# Patient Record
Sex: FEMALE | Race: ASIAN | NOT HISPANIC OR LATINO
[De-identification: names, ages, dates, MRNs, and addresses within clinical notes are randomized per-mention and may not be internally consistent; named-entity substitution may affect disease eponyms.]

---

## 2018-12-03 PROBLEM — Z00.00 ENCOUNTER FOR PREVENTIVE HEALTH EXAMINATION: Status: ACTIVE | Noted: 2018-12-03

## 2019-01-08 ENCOUNTER — APPOINTMENT (OUTPATIENT)
Dept: THORACIC SURGERY | Facility: CLINIC | Age: 54
End: 2019-01-08

## 2019-02-10 ENCOUNTER — INPATIENT (INPATIENT)
Facility: HOSPITAL | Age: 54
LOS: 2 days | Discharge: HOME | DRG: 57 | End: 2019-02-13
Attending: INTERNAL MEDICINE | Admitting: INTERNAL MEDICINE
Payer: MEDICAID

## 2019-02-10 VITALS
DIASTOLIC BLOOD PRESSURE: 74 MMHG | HEART RATE: 70 BPM | TEMPERATURE: 99 F | SYSTOLIC BLOOD PRESSURE: 126 MMHG | RESPIRATION RATE: 16 BRPM | OXYGEN SATURATION: 100 % | WEIGHT: 149.91 LBS | HEIGHT: 63 IN

## 2019-02-10 NOTE — ED ADULT TRIAGE NOTE - CHIEF COMPLAINT QUOTE
biba with c/o generalized weakness,difficulty walking since hour and a half,patient has good upperarm ,no drooling seen and speak normal as per family member

## 2019-02-11 DIAGNOSIS — Z29.9 ENCOUNTER FOR PROPHYLACTIC MEASURES, UNSPECIFIED: ICD-10-CM

## 2019-02-11 DIAGNOSIS — G70.00 MYASTHENIA GRAVIS WITHOUT (ACUTE) EXACERBATION: ICD-10-CM

## 2019-02-11 LAB
ALBUMIN SERPL ELPH-MCNC: 3.7 G/DL — SIGNIFICANT CHANGE UP (ref 3.5–5)
ALP SERPL-CCNC: 70 U/L — SIGNIFICANT CHANGE UP (ref 40–120)
ALT FLD-CCNC: 32 U/L DA — SIGNIFICANT CHANGE UP (ref 10–60)
ANION GAP SERPL CALC-SCNC: 4 MMOL/L — LOW (ref 5–17)
ANION GAP SERPL CALC-SCNC: 6 MMOL/L — SIGNIFICANT CHANGE UP (ref 5–17)
APPEARANCE UR: CLEAR — SIGNIFICANT CHANGE UP
APTT BLD: 31.1 SEC — SIGNIFICANT CHANGE UP (ref 27.5–36.3)
AST SERPL-CCNC: 27 U/L — SIGNIFICANT CHANGE UP (ref 10–40)
BASOPHILS # BLD AUTO: 0.1 K/UL — SIGNIFICANT CHANGE UP (ref 0–0.2)
BASOPHILS # BLD AUTO: 0.1 K/UL — SIGNIFICANT CHANGE UP (ref 0–0.2)
BASOPHILS NFR BLD AUTO: 0.7 % — SIGNIFICANT CHANGE UP (ref 0–2)
BASOPHILS NFR BLD AUTO: 0.8 % — SIGNIFICANT CHANGE UP (ref 0–2)
BILIRUB SERPL-MCNC: 0.2 MG/DL — SIGNIFICANT CHANGE UP (ref 0.2–1.2)
BILIRUB UR-MCNC: NEGATIVE — SIGNIFICANT CHANGE UP
BUN SERPL-MCNC: 13 MG/DL — SIGNIFICANT CHANGE UP (ref 7–18)
BUN SERPL-MCNC: 14 MG/DL — SIGNIFICANT CHANGE UP (ref 7–18)
CALCIUM SERPL-MCNC: 8.2 MG/DL — LOW (ref 8.4–10.5)
CALCIUM SERPL-MCNC: 8.6 MG/DL — SIGNIFICANT CHANGE UP (ref 8.4–10.5)
CHLORIDE SERPL-SCNC: 108 MMOL/L — SIGNIFICANT CHANGE UP (ref 96–108)
CHLORIDE SERPL-SCNC: 110 MMOL/L — HIGH (ref 96–108)
CHOLEST SERPL-MCNC: 179 MG/DL — SIGNIFICANT CHANGE UP (ref 10–199)
CO2 SERPL-SCNC: 26 MMOL/L — SIGNIFICANT CHANGE UP (ref 22–31)
CO2 SERPL-SCNC: 29 MMOL/L — SIGNIFICANT CHANGE UP (ref 22–31)
COLOR SPEC: YELLOW — SIGNIFICANT CHANGE UP
CREAT SERPL-MCNC: 0.71 MG/DL — SIGNIFICANT CHANGE UP (ref 0.5–1.3)
CREAT SERPL-MCNC: 0.71 MG/DL — SIGNIFICANT CHANGE UP (ref 0.5–1.3)
DIFF PNL FLD: ABNORMAL
EOSINOPHIL # BLD AUTO: 0.2 K/UL — SIGNIFICANT CHANGE UP (ref 0–0.5)
EOSINOPHIL # BLD AUTO: 0.2 K/UL — SIGNIFICANT CHANGE UP (ref 0–0.5)
EOSINOPHIL NFR BLD AUTO: 1.5 % — SIGNIFICANT CHANGE UP (ref 0–6)
EOSINOPHIL NFR BLD AUTO: 1.6 % — SIGNIFICANT CHANGE UP (ref 0–6)
GLUCOSE SERPL-MCNC: 121 MG/DL — HIGH (ref 70–99)
GLUCOSE SERPL-MCNC: 143 MG/DL — HIGH (ref 70–99)
GLUCOSE UR QL: 100 MG/DL
HBA1C BLD-MCNC: 5.8 % — HIGH (ref 4–5.6)
HCG SERPL-ACNC: <1 MIU/ML — SIGNIFICANT CHANGE UP
HCT VFR BLD CALC: 42.1 % — SIGNIFICANT CHANGE UP (ref 34.5–45)
HCT VFR BLD CALC: 43.3 % — SIGNIFICANT CHANGE UP (ref 34.5–45)
HDLC SERPL-MCNC: 84 MG/DL — SIGNIFICANT CHANGE UP
HGB BLD-MCNC: 13.7 G/DL — SIGNIFICANT CHANGE UP (ref 11.5–15.5)
HGB BLD-MCNC: 14.1 G/DL — SIGNIFICANT CHANGE UP (ref 11.5–15.5)
INR BLD: 0.87 RATIO — LOW (ref 0.88–1.16)
KETONES UR-MCNC: NEGATIVE — SIGNIFICANT CHANGE UP
LEUKOCYTE ESTERASE UR-ACNC: ABNORMAL
LIPID PNL WITH DIRECT LDL SERPL: 79 MG/DL — SIGNIFICANT CHANGE UP
LYMPHOCYTES # BLD AUTO: 1.5 K/UL — SIGNIFICANT CHANGE UP (ref 1–3.3)
LYMPHOCYTES # BLD AUTO: 1.5 K/UL — SIGNIFICANT CHANGE UP (ref 1–3.3)
LYMPHOCYTES # BLD AUTO: 12.6 % — LOW (ref 13–44)
LYMPHOCYTES # BLD AUTO: 15.2 % — SIGNIFICANT CHANGE UP (ref 13–44)
MAGNESIUM SERPL-MCNC: 2.2 MG/DL — SIGNIFICANT CHANGE UP (ref 1.6–2.6)
MCHC RBC-ENTMCNC: 31.2 PG — SIGNIFICANT CHANGE UP (ref 27–34)
MCHC RBC-ENTMCNC: 31.2 PG — SIGNIFICANT CHANGE UP (ref 27–34)
MCHC RBC-ENTMCNC: 32.5 GM/DL — SIGNIFICANT CHANGE UP (ref 32–36)
MCHC RBC-ENTMCNC: 32.6 GM/DL — SIGNIFICANT CHANGE UP (ref 32–36)
MCV RBC AUTO: 95.6 FL — SIGNIFICANT CHANGE UP (ref 80–100)
MCV RBC AUTO: 96 FL — SIGNIFICANT CHANGE UP (ref 80–100)
MONOCYTES # BLD AUTO: 0.4 K/UL — SIGNIFICANT CHANGE UP (ref 0–0.9)
MONOCYTES # BLD AUTO: 0.6 K/UL — SIGNIFICANT CHANGE UP (ref 0–0.9)
MONOCYTES NFR BLD AUTO: 3.6 % — SIGNIFICANT CHANGE UP (ref 2–14)
MONOCYTES NFR BLD AUTO: 5.2 % — SIGNIFICANT CHANGE UP (ref 2–14)
NEUTROPHILS # BLD AUTO: 7.8 K/UL — HIGH (ref 1.8–7.4)
NEUTROPHILS # BLD AUTO: 9.3 K/UL — HIGH (ref 1.8–7.4)
NEUTROPHILS NFR BLD AUTO: 79 % — HIGH (ref 43–77)
NEUTROPHILS NFR BLD AUTO: 80 % — HIGH (ref 43–77)
NITRITE UR-MCNC: NEGATIVE — SIGNIFICANT CHANGE UP
PH UR: 7 — SIGNIFICANT CHANGE UP (ref 5–8)
PHOSPHATE SERPL-MCNC: 3.5 MG/DL — SIGNIFICANT CHANGE UP (ref 2.5–4.5)
PLATELET # BLD AUTO: 228 K/UL — SIGNIFICANT CHANGE UP (ref 150–400)
PLATELET # BLD AUTO: 242 K/UL — SIGNIFICANT CHANGE UP (ref 150–400)
POTASSIUM SERPL-MCNC: 3.9 MMOL/L — SIGNIFICANT CHANGE UP (ref 3.5–5.3)
POTASSIUM SERPL-MCNC: 4 MMOL/L — SIGNIFICANT CHANGE UP (ref 3.5–5.3)
POTASSIUM SERPL-SCNC: 3.9 MMOL/L — SIGNIFICANT CHANGE UP (ref 3.5–5.3)
POTASSIUM SERPL-SCNC: 4 MMOL/L — SIGNIFICANT CHANGE UP (ref 3.5–5.3)
PROT SERPL-MCNC: 7.5 G/DL — SIGNIFICANT CHANGE UP (ref 6–8.3)
PROT UR-MCNC: NEGATIVE — SIGNIFICANT CHANGE UP
PROTHROM AB SERPL-ACNC: 9.6 SEC — LOW (ref 10–12.9)
RBC # BLD: 4.38 M/UL — SIGNIFICANT CHANGE UP (ref 3.8–5.2)
RBC # BLD: 4.53 M/UL — SIGNIFICANT CHANGE UP (ref 3.8–5.2)
RBC # FLD: 12.1 % — SIGNIFICANT CHANGE UP (ref 10.3–14.5)
RBC # FLD: 12.5 % — SIGNIFICANT CHANGE UP (ref 10.3–14.5)
SODIUM SERPL-SCNC: 141 MMOL/L — SIGNIFICANT CHANGE UP (ref 135–145)
SODIUM SERPL-SCNC: 142 MMOL/L — SIGNIFICANT CHANGE UP (ref 135–145)
SP GR SPEC: 1.01 — SIGNIFICANT CHANGE UP (ref 1.01–1.02)
TOTAL CHOLESTEROL/HDL RATIO MEASUREMENT: 2.1 RATIO — LOW (ref 3.3–7.1)
TRIGL SERPL-MCNC: 78 MG/DL — SIGNIFICANT CHANGE UP (ref 10–149)
TROPONIN I SERPL-MCNC: 0.02 NG/ML — SIGNIFICANT CHANGE UP (ref 0–0.04)
TSH SERPL-MCNC: 1.15 UU/ML — SIGNIFICANT CHANGE UP (ref 0.34–4.82)
UROBILINOGEN FLD QL: NEGATIVE — SIGNIFICANT CHANGE UP
WBC # BLD: 11.6 K/UL — HIGH (ref 3.8–10.5)
WBC # BLD: 9.9 K/UL — SIGNIFICANT CHANGE UP (ref 3.8–10.5)
WBC # FLD AUTO: 11.6 K/UL — HIGH (ref 3.8–10.5)
WBC # FLD AUTO: 9.9 K/UL — SIGNIFICANT CHANGE UP (ref 3.8–10.5)

## 2019-02-11 PROCEDURE — 99222 1ST HOSP IP/OBS MODERATE 55: CPT

## 2019-02-11 PROCEDURE — 99223 1ST HOSP IP/OBS HIGH 75: CPT

## 2019-02-11 PROCEDURE — 99285 EMERGENCY DEPT VISIT HI MDM: CPT | Mod: 25

## 2019-02-11 PROCEDURE — 70450 CT HEAD/BRAIN W/O DYE: CPT | Mod: 26

## 2019-02-11 PROCEDURE — 71046 X-RAY EXAM CHEST 2 VIEWS: CPT | Mod: 26

## 2019-02-11 RX ORDER — ENOXAPARIN SODIUM 100 MG/ML
40 INJECTION SUBCUTANEOUS EVERY 24 HOURS
Qty: 0 | Refills: 0 | Status: DISCONTINUED | OUTPATIENT
Start: 2019-02-11 | End: 2019-02-13

## 2019-02-11 RX ORDER — AZATHIOPRINE 100 MG/1
50 TABLET ORAL DAILY
Qty: 0 | Refills: 0 | Status: DISCONTINUED | OUTPATIENT
Start: 2019-02-11 | End: 2019-02-13

## 2019-02-11 RX ORDER — SODIUM CHLORIDE 9 MG/ML
3 INJECTION INTRAMUSCULAR; INTRAVENOUS; SUBCUTANEOUS ONCE
Qty: 0 | Refills: 0 | Status: COMPLETED | OUTPATIENT
Start: 2019-02-11 | End: 2019-02-11

## 2019-02-11 RX ORDER — CEFTRIAXONE 500 MG/1
1 INJECTION, POWDER, FOR SOLUTION INTRAMUSCULAR; INTRAVENOUS ONCE
Qty: 0 | Refills: 0 | Status: COMPLETED | OUTPATIENT
Start: 2019-02-11 | End: 2019-02-11

## 2019-02-11 RX ORDER — PYRIDOSTIGMINE BROMIDE 60 MG/5ML
2 SOLUTION ORAL
Qty: 0 | Refills: 0 | COMMUNITY

## 2019-02-11 RX ORDER — PYRIDOSTIGMINE BROMIDE 60 MG/5ML
0 SOLUTION ORAL
Qty: 180 | Refills: 0 | COMMUNITY

## 2019-02-11 RX ORDER — IMMUNE GLOBULIN (HUMAN) 10 G/100ML
30 INJECTION INTRAVENOUS; SUBCUTANEOUS ONCE
Qty: 0 | Refills: 0 | Status: DISCONTINUED | OUTPATIENT
Start: 2019-02-11 | End: 2019-02-11

## 2019-02-11 RX ORDER — PYRIDOSTIGMINE BROMIDE 60 MG/5ML
60 SOLUTION ORAL THREE TIMES A DAY
Qty: 0 | Refills: 0 | Status: DISCONTINUED | OUTPATIENT
Start: 2019-02-11 | End: 2019-02-13

## 2019-02-11 RX ORDER — IMMUNE GLOBULIN (HUMAN) 10 G/100ML
27 INJECTION INTRAVENOUS; SUBCUTANEOUS ONCE
Qty: 0 | Refills: 0 | Status: DISCONTINUED | OUTPATIENT
Start: 2019-02-11 | End: 2019-02-11

## 2019-02-11 RX ORDER — CEFTRIAXONE 500 MG/1
INJECTION, POWDER, FOR SOLUTION INTRAMUSCULAR; INTRAVENOUS
Qty: 0 | Refills: 0 | Status: DISCONTINUED | OUTPATIENT
Start: 2019-02-11 | End: 2019-02-13

## 2019-02-11 RX ORDER — IMMUNE GLOBULIN (HUMAN) 10 G/100ML
10 INJECTION INTRAVENOUS; SUBCUTANEOUS DAILY
Qty: 0 | Refills: 0 | Status: COMPLETED | OUTPATIENT
Start: 2019-02-11 | End: 2019-02-12

## 2019-02-11 RX ORDER — CEFTRIAXONE 500 MG/1
1 INJECTION, POWDER, FOR SOLUTION INTRAMUSCULAR; INTRAVENOUS EVERY 24 HOURS
Qty: 0 | Refills: 0 | Status: DISCONTINUED | OUTPATIENT
Start: 2019-02-12 | End: 2019-02-13

## 2019-02-11 RX ADMIN — CEFTRIAXONE 100 GRAM(S): 500 INJECTION, POWDER, FOR SOLUTION INTRAMUSCULAR; INTRAVENOUS at 06:29

## 2019-02-11 RX ADMIN — ENOXAPARIN SODIUM 40 MILLIGRAM(S): 100 INJECTION SUBCUTANEOUS at 06:29

## 2019-02-11 RX ADMIN — Medication 50 MILLIGRAM(S): at 08:33

## 2019-02-11 RX ADMIN — PYRIDOSTIGMINE BROMIDE 60 MILLIGRAM(S): 60 SOLUTION ORAL at 15:40

## 2019-02-11 RX ADMIN — SODIUM CHLORIDE 3 MILLILITER(S): 9 INJECTION INTRAMUSCULAR; INTRAVENOUS; SUBCUTANEOUS at 02:40

## 2019-02-11 RX ADMIN — IMMUNE GLOBULIN (HUMAN) 100 GRAM(S): 10 INJECTION INTRAVENOUS; SUBCUTANEOUS at 18:38

## 2019-02-11 RX ADMIN — IMMUNE GLOBULIN (HUMAN) 100 GRAM(S): 10 INJECTION INTRAVENOUS; SUBCUTANEOUS at 19:21

## 2019-02-11 RX ADMIN — AZATHIOPRINE 50 MILLIGRAM(S): 100 TABLET ORAL at 19:19

## 2019-02-11 NOTE — ED ADULT NURSE NOTE - CHIEF COMPLAINT QUOTE
PATIENT INSTRUCTIONS:      We discussed your CT lumbar spine findings. You were provided with a copy of your radiology report, for your records.       Will prescribe Naproxen, which is an anti-inflammatory medication. You can take 1 tab (500 mg) up to 2 times per day as needed. Always take with food/beverage. Risks/side effects: stomach irritation, effects on heart or kidneys.       Consider over-the-counter medication with Lidocaine in it (e.g. Salonpas patch, which has Lidocaine 4%). Apply this to the back. Follow directions on packaging.       Will order course of water therapy. Will provide prescription that you can take to the [facility]; we can also fax this prescription if needed. Will otherwise provide you with list of local pool locations. Check with your insurance about coverage.       Return to clinic after completion of therapy, approximately 8 weeks.     Thank you!    Scheduling number to the [facility]: [phone]. Call this number to schedule or change appointments.    Nurse Triage line: [phone]  Call this number with any questions or concerns. You can leave a message anytime. Please leave a detailed message. Calls are returned between 8 AM and 4 PM Monday through Thursday and from 8 AM to 12 Noon on Fridays. We usually get back to you within 24 to 48 hours depending on the issue/request.     We believe regular attendance is key to your success in our program.    Any time you are unable to keep your appointment we ask that you call us at least 24 hours in advance to let us know. This will allow us to offer the appointment time to another patient.       
biba with c/o generalized weakness,difficulty walking since hour and a half,patient has good upperarm ,no drooling seen and speak normal as per family member

## 2019-02-11 NOTE — H&P ADULT - PROBLEM SELECTOR PLAN 2
IMPROVE VTE Individual Risk Assessment    RISK                                                          Points  [] Previous VTE                                           3  [] Thrombophilia                                        2  [] Lower limb paralysis                              2   [] Current Cancer                                       2   [x] Immobilization > 24 hrs                        1  [] ICU/CCU stay > 24 hours                       1  [] Age > 60                                                   1    IMPROVE VTE Score: 1, no indication for DVT PPx

## 2019-02-11 NOTE — H&P ADULT - HISTORY OF PRESENT ILLNESS
55 y/o Mandarin F w/ PMH Myasthenia Gravis and no PSH p/w multiple falls x 1 day - last fall near 11 PM, last took Pyridostigmine at 6 PM. At the time of fall patient was unable to stand up - called her family who came and gave her 120 mg which improved her weakness. As per patient she has been taking the medications x 10 years - on and off - and recently restarted in January 2018 for worsening weakness. Of note patient began taking Prednisone ? mg since Friday. Otherwise patient denied any fevers, dysphagia, chills, CP, SOB, cough, abdominal pain, NVDC, urinary changes, or any other complaints. Attempted to reach the pharmacy to clarify medication dosing including steroid taper - did not . In the ED patient seen in NAD, no respiratory complaints, and labs noted for WBC elevation 11.6 and +UA albeit contaminated w/ squamous cells.

## 2019-02-11 NOTE — CHART NOTE - NSCHARTNOTEFT_GEN_A_CORE
Discussed the case w/ neurologist Dr. Reich who recommended starting Prednisone, monitoring NIF/VC, and preparing for IVIG infusions- Pending official consultation from Dr Newman

## 2019-02-11 NOTE — ED PROVIDER NOTE - PROGRESS NOTE DETAILS
labs unremarkable, CT head neg, CXR unremarkable  Discussed case with Dr. Reich-neurologist who recommends pyridostigmine 60mg TID and initiation of IVIG 400mg/kg per day for 5 days while inpatient.  discussed above with patient, pt stable for admission

## 2019-02-11 NOTE — H&P ADULT - ATTENDING COMMENTS
Discussed with Dr Austin - medical resident.  This is a 54 year old woman with PMH of Myasthenia Gravis followed in the o/p clinic by neurologist - Sam Bellamy ( [phone]) who presents with progressive and frequent falls over the last few weeks. She states that in the past 24 hours however, she had multiple falls with the last episode last evening after which she was unable to get up by herself. She denies any preceding symptoms and no LOC. She did not hit her head and denies any focal pain in the body or extremities.   Of note, her neurologist has been increasing / adjusting her dose of pyridostigmine and she used double the dose as instructed and she got better. She decided to come to the ED afterwards.  ROS was noncontributory except for subjective fever after her fall today.    Vital Signs Last 24 Hrs  T(C): 37.1 (10 Feb 2019 23:56), Max: 37.1 (10 Feb 2019 23:56)  T(F): 98.8 (10 Feb 2019 23:56), Max: 98.8 (10 Feb 2019 23:56)  HR: 70 (10 Feb 2019 23:56) (70 - 70)  BP: 126/74 (10 Feb 2019 23:56) (126/74 - 126/74)  RR: 16 (10 Feb 2019 23:56) (16 - 16)  SpO2: 100% (10 Feb 2019 23:56) (100% - 100%)    middle aged woman, NAD, AAO X 3  MMM, good degluttition, no LAD  CTA B/L; RRR S1S2   Soft, flat, NT ND BS +  No pedal edema  Power 4/5 in both forearms and 3/5 in both arms  Power 4/5 in both legs and 3/5 in the thighs  No CN deficits noted  No sensory deficits  Labs                        14.1   11.6  )-----------( 242      ( 2019 01:39 )             43.3   02-11    142  |  110<H>  |  14  ----------------------------<  121<H>  3.9   |  26  |  0.71    Ca    8.6      2019 01:39    TPro  7.5  /  Alb  3.7  /  TBili  0.2  /  DBili  x   /  AST  27  /  ALT  32  /  Alk Phos  70  02-11    Urinalysis Basic - ( 2019 00:43 )    Color: Yellow / Appearance: Clear / S.010 / pH: x  Gluc: x / Ketone: Negative  / Bili: Negative / Urobili: Negative   Blood: x / Protein: Negative / Nitrite: Negative   Leuk Esterase: Moderate / RBC: 5-10 /HPF / WBC 11-25 /HPF   Sq Epi: x / Non Sq Epi: Moderate /HPF / Bacteria: Moderate /HPF    trop - 0.024    Impression  Myasthenia gravis crises- worsening likely from underlying infection - UTI  Admit to medical [facility]opez  Obtain ucx and start empiric antibiotics - Rocephin  Continue pyridostigmine 60 mg PO TID  Hold steroid and await neurologist evaluation  Tentative recommendation for IVIG; would await his evaluation this AM

## 2019-02-11 NOTE — ED ADULT NURSE NOTE - NSIMPLEMENTINTERV_GEN_ALL_ED
Implemented All Fall Risk Interventions:  [place] to call system. Call bell, personal items and telephone within reach. Instruct patient to call for assistance. Room bathroom lighting operational. Non-slip footwear when patient is off stretcher. Physically safe environment: no spills, clutter or unnecessary equipment. Stretcher in lowest position, wheels locked, appropriate side rails in place. Provide visual cue, wrist band, yellow gown, etc. Monitor gait and stability. Monitor for mental status changes and reorient to person, place, and time. Review medications for side effects contributing to fall risk. Reinforce activity limits and safety measures with patient and family.

## 2019-02-11 NOTE — CONSULT NOTE ADULT - SUBJECTIVE AND OBJECTIVE BOX
Administer IVIg 400mg/kg (6 g) IV Daily x 5 days (30 g total over 5 days)  Continue pyridostigmine 40mg TID  Continue prednisone 50mg Daily - will taper as outpatient over 6 months  Start azathioprine 50mg daily x 7 days, then increase to 100mg daily x 7 days, then increase to 150mg daily as goal dose (non-steroidal immunosuppressant)  NIF Q6H  PT/OT    NOTE TO BE COMPLETED    Neurology Consult    Patient is a 54y old  Female who presents with a chief complaint of weakness (2019 04:13)      HPI:  53 y/o Mandarin F w/ PMH Myasthenia Gravis and no PSH p/w multiple falls x 1 day - last fall near 11 PM, last took Pyridostigmine at 6 PM. At the time of fall patient was unable to stand up - called her family who came and gave her 120 mg which improved her weakness. As per patient she has been taking the medications x 10 years - on and off - and recently restarted in 2018 for worsening weakness. Of note patient began taking Prednisone ? mg since Friday. Otherwise patient denied any fevers, dysphagia, chills, CP, SOB, cough, abdominal pain, NVDC, urinary changes, or any other complaints. Attempted to reach the pharmacy to clarify medication dosing including steroid taper - did not . In the ED patient seen in NAD, no respiratory complaints, and labs noted for WBC elevation 11.6 and +UA albeit contaminated w/ squamous cells. (2019 04:13)      PAST MEDICAL & SURGICAL HISTORY:  Myasthenia gravis  No significant past surgical history      FAMILY HISTORY:  No pertinent family history in first degree relatives      Social History: (-) x 3    Allergies    No Known Allergies    Intolerances        MEDICATIONS  (STANDING):  cefTRIAXone   IVPB      enoxaparin Injectable 40 milliGRAM(s) SubCutaneous every 24 hours  immune   globulin 10% (GAMMAGARD) IVPB 6 Gram(s) IV Intermittent daily  predniSONE   Tablet 50 milliGRAM(s) Oral daily  pyridostigmine 60 milliGRAM(s) Oral three times a day    MEDICATIONS  (PRN):      Review of systems:    Constitutional: No fever, weight loss or fatigue    Eyes: No eye pain or discharge  ENMT:  No difficulty hearing; No sinus or throat pain  Neck: No pain or stiffness  Respiratory: No cough, wheezing, chills or hemoptysis  Cardiovascular: No chest pain, palpitations, shortness of breath, dyspnea on exertion  Gastrointestinal: No abdominal pain, nausea, vomiting or hematemesis; No diarrhea or constipation.   Genitourinary: No dysuria, frequency, hematuria or incontinence  Neurological: As per HPI  Skin: No rashes or lesions   Endocrine: No heat or cold intolerance; No hair loss  Musculoskeletal: No joint pain or swelling  Psychiatric: No depression, anxiety, mood swings  Heme/Lymph: No easy bruising or bleeding gums    Vital Signs Last 24 Hrs  T(C): 37.3 (2019 11:00), Max: 37.4 (2019 07:50)  T(F): 99.2 (2019 11:00), Max: 99.3 (2019 07:50)  HR: 65 (:) (58 - 70)  BP: 99/61 (2019 11:) (97/49 - 126/74)  BP(mean): --  RR: 16 (2019 11:00) (16 - 16)  SpO2: 100% (:) (98% - 100%)    Neurologic Examination:  General:  Appearance is consistent with chronologic age.  No abnormal facies.   General: The patient is oriented to person, place, time and date.  Recent and remote memory intact.  Fund of knowledge is intact and normal.  Language with normal repetition, comprehension and naming.  Nondysarthric.    Cranial nerves: intact VA, VFF.  EOMI w/o nystagmus, skew or reported double vision.  PERRL.  No ptosis/weakness of eyelid closure.  Facial sensation is normal with normal bite.  No facial asymmetry.  Hearing grossly intact b/l.  Palate elevates midline.  Tongue midline.  Motor examination:   Normal tone, bulk and range of motion.  No tenderness, twitching, tremors or involuntary movements.  Formal Muscle Strength Testing: (MRC grade R/L) 5/5 UE; 5/5 LE.  No observable drift.  Reflexes:   2+ b/l pectoralis, biceps, triceps, brachioradialis, patella and Achilles.  Plantar response downgoing b/l.  Jaw jerk, Fareed, clonus absent.  Sensory examination:   Intact to light touch and pinprick, pain, temperature and proprioception and vibration in all extremities.  Cerebellum:   FTN/HKS intact with normal SHWETHA in all limbs.  No dysmetria or dysdiadokinesia.  Gait narrow based and normal.    Labs:   CBC Full  -  ( 2019 10:07 )  WBC Count : 9.9 K/uL  Hemoglobin : 13.7 g/dL  Hematocrit : 42.1 %  Platelet Count - Automated : 228 K/uL  Mean Cell Volume : 96.0 fl  Mean Cell Hemoglobin : 31.2 pg  Mean Cell Hemoglobin Concentration : 32.5 gm/dL  Auto Neutrophil # : 7.8 K/uL  Auto Lymphocyte # : 1.5 K/uL  Auto Monocyte # : 0.4 K/uL  Auto Eosinophil # : 0.2 K/uL  Auto Basophil # : 0.1 K/uL  Auto Neutrophil % : 79.0 %  Auto Lymphocyte % : 15.2 %  Auto Monocyte % : 3.6 %  Auto Eosinophil % : 1.6 %  Auto Basophil % : 0.8 %        141  |  108  |  13  ----------------------------<  143<H>  4.0   |  29  |  0.71    Ca    8.2<L>      2019 10:07  Phos  3.5       Mg     2.2         TPro  7.5  /  Alb  3.7  /  TBili  0.2  /  DBili  x   /  AST  27  /  ALT  32  /  AlkPhos  70  11    LIVER FUNCTIONS - ( 2019 01:39 )  Alb: 3.7 g/dL / Pro: 7.5 g/dL / ALK PHOS: 70 U/L / ALT: 32 U/L DA / AST: 27 U/L / GGT: x           PT/INR - ( 2019 01:39 )   PT: 9.6 sec;   INR: 0.87 ratio         PTT - ( 2019 01:39 )  PTT:31.1 sec  Urinalysis Basic - ( 2019 00:43 )    Color: Yellow / Appearance: Clear / S.010 / pH: x  Gluc: x / Ketone: Negative  / Bili: Negative / Urobili: Negative   Blood: x / Protein: Negative / Nitrite: Negative   Leuk Esterase: Moderate / RBC: 5-10 /HPF / WBC 11-25 /HPF   Sq Epi: x / Non Sq Epi: Moderate /HPF / Bacteria: Moderate /HPF          Neuroimaging:  NCHCT: CT Head No Cont:   EXAM:  CT BRAIN                            PROCEDURE DATE:  2019          INTERPRETATION:  Clinical indication: Trauma.    Technique: CT axial images of the head were obtained without intravenous   contrast. Computer-reconstructed coronal andsagittal images were   obtained.    Comparison: None.    Findings: There is no acute intracranial hemorrhage, large cortical   infarct, mass effect or midline shift. There is mild prominence of the   cortical sulci. The ventricles are not dilated.     There is no depressed skull fracture. There is minimal mucosal thickening   of the sinuses. The tympanomastoid region is clear.      Impression:     No acute intracranial hemorrhage or displaced skull fracture. If   clinically indicated, short-term follow-up or MRI may be obtained for   further evaluation.                GEOVANY RATLIFF M.D., ATTENDING RADIOLOGIST  This document has been electronically signed. 2019  3:30AM             (19 @ 03:33)    CT Angiography/Perfusion:  MRI Brain NC:  MRA Head/Neck:  EEG:    Assessment:  This is a 54y Female with h/o     Plan:   -   19 @ 13:44          Please contact the Neurology consult service with any questions.    Jose Antonio MD  Neurology Attending  [facility] Administer IVIg 667 mg/kg (10 g) IV Daily x 3 days (30 g total over 5 days)  Continue pyridostigmine 40mg TID  Continue prednisone 50mg Daily - will taper as outpatient over 6 months  Start azathioprine 50mg daily x 7 days, then increase to 100mg daily x 7 days, then increase to 150mg daily as goal dose (non-steroidal immunosuppressant)  NIF Q6H  PT/OT    NOTE TO BE COMPLETED    Neurology Consult    Patient is a 54y old  Female who presents with a chief complaint of weakness (2019 04:13)      HPI:  55 y/o Mandarin F w/ PMH Myasthenia Gravis and no PSH p/w multiple falls x 1 day - last fall near 11 PM, last took Pyridostigmine at 6 PM. At the time of fall patient was unable to stand up - called her family who came and gave her 120 mg which improved her weakness. As per patient she has been taking the medications x 10 years - on and off - and recently restarted in 2018 for worsening weakness. Of note patient began taking Prednisone ? mg since Friday. Otherwise patient denied any fevers, dysphagia, chills, CP, SOB, cough, abdominal pain, NVDC, urinary changes, or any other complaints. Attempted to reach the pharmacy to clarify medication dosing including steroid taper - did not . In the ED patient seen in NAD, no respiratory complaints, and labs noted for WBC elevation 11.6 and +UA albeit contaminated w/ squamous cells. (2019 04:13)      PAST MEDICAL & SURGICAL HISTORY:  Myasthenia gravis  No significant past surgical history      FAMILY HISTORY:  No pertinent family history in first degree relatives      Social History: (-) x 3    Allergies    No Known Allergies    Intolerances        MEDICATIONS  (STANDING):  cefTRIAXone   IVPB      enoxaparin Injectable 40 milliGRAM(s) SubCutaneous every 24 hours  immune   globulin 10% (GAMMAGARD) IVPB 6 Gram(s) IV Intermittent daily  predniSONE   Tablet 50 milliGRAM(s) Oral daily  pyridostigmine 60 milliGRAM(s) Oral three times a day    MEDICATIONS  (PRN):      Review of systems:    Constitutional: No fever, weight loss or fatigue    Eyes: No eye pain or discharge  ENMT:  No difficulty hearing; No sinus or throat pain  Neck: No pain or stiffness  Respiratory: No cough, wheezing, chills or hemoptysis  Cardiovascular: No chest pain, palpitations, shortness of breath, dyspnea on exertion  Gastrointestinal: No abdominal pain, nausea, vomiting or hematemesis; No diarrhea or constipation.   Genitourinary: No dysuria, frequency, hematuria or incontinence  Neurological: As per HPI  Skin: No rashes or lesions   Endocrine: No heat or cold intolerance; No hair loss  Musculoskeletal: No joint pain or swelling  Psychiatric: No depression, anxiety, mood swings  Heme/Lymph: No easy bruising or bleeding gums    Vital Signs Last 24 Hrs  T(C): 37.3 (2019 11:00), Max: 37.4 (2019 07:50)  T(F): 99.2 (2019 11:00), Max: 99.3 (2019 07:50)  HR: 65 (:) (58 - 70)  BP: 99/61 (2019 11:) (97/49 - 126/74)  BP(mean): --  RR: 16 (2019 11:) (16 - 16)  SpO2: 100% (:) (98% - 100%)    Neurologic Examination:  General:  Appearance is consistent with chronologic age.  No abnormal facies.   General: The patient is oriented to person, place, time and date.  Recent and remote memory intact.  Fund of knowledge is intact and normal.  Language with normal repetition, comprehension and naming.  Nondysarthric.    Cranial nerves: intact VA, VFF.  EOMI w/o nystagmus, skew or reported double vision.  PERRL.  No ptosis/weakness of eyelid closure.  Facial sensation is normal with normal bite.  No facial asymmetry.  Hearing grossly intact b/l.  Palate elevates midline.  Tongue midline.  Motor examination:   Normal tone, bulk and range of motion.  No tenderness, twitching, tremors or involuntary movements.  Formal Muscle Strength Testing: (MRC grade R/L) 5/5 UE; 5/5 LE.  No observable drift.  Reflexes:   2+ b/l pectoralis, biceps, triceps, brachioradialis, patella and Achilles.  Plantar response downgoing b/l.  Jaw jerk, Fareed, clonus absent.  Sensory examination:   Intact to light touch and pinprick, pain, temperature and proprioception and vibration in all extremities.  Cerebellum:   FTN/HKS intact with normal SHWETHA in all limbs.  No dysmetria or dysdiadokinesia.  Gait narrow based and normal.    Labs:   CBC Full  -  ( 2019 10:07 )  WBC Count : 9.9 K/uL  Hemoglobin : 13.7 g/dL  Hematocrit : 42.1 %  Platelet Count - Automated : 228 K/uL  Mean Cell Volume : 96.0 fl  Mean Cell Hemoglobin : 31.2 pg  Mean Cell Hemoglobin Concentration : 32.5 gm/dL  Auto Neutrophil # : 7.8 K/uL  Auto Lymphocyte # : 1.5 K/uL  Auto Monocyte # : 0.4 K/uL  Auto Eosinophil # : 0.2 K/uL  Auto Basophil # : 0.1 K/uL  Auto Neutrophil % : 79.0 %  Auto Lymphocyte % : 15.2 %  Auto Monocyte % : 3.6 %  Auto Eosinophil % : 1.6 %  Auto Basophil % : 0.8 %        141  |  108  |  13  ----------------------------<  143<H>  4.0   |  29  |  0.71    Ca    8.2<L>      2019 10:07  Phos  3.5       Mg     2.2         TPro  7.5  /  Alb  3.7  /  TBili  0.2  /  DBili  x   /  AST  27  /  ALT  32  /  AlkPhos  70  11    LIVER FUNCTIONS - ( 2019 01:39 )  Alb: 3.7 g/dL / Pro: 7.5 g/dL / ALK PHOS: 70 U/L / ALT: 32 U/L DA / AST: 27 U/L / GGT: x           PT/INR - ( 2019 01:39 )   PT: 9.6 sec;   INR: 0.87 ratio         PTT - ( 2019 01:39 )  PTT:31.1 sec  Urinalysis Basic - ( 2019 00:43 )    Color: Yellow / Appearance: Clear / S.010 / pH: x  Gluc: x / Ketone: Negative  / Bili: Negative / Urobili: Negative   Blood: x / Protein: Negative / Nitrite: Negative   Leuk Esterase: Moderate / RBC: 5-10 /HPF / WBC 11-25 /HPF   Sq Epi: x / Non Sq Epi: Moderate /HPF / Bacteria: Moderate /HPF          Neuroimaging:  NCHCT: CT Head No Cont:   EXAM:  CT BRAIN                            PROCEDURE DATE:  2019          INTERPRETATION:  Clinical indication: Trauma.    Technique: CT axial images of the head were obtained without intravenous   contrast. Computer-reconstructed coronal andsagittal images were   obtained.    Comparison: None.    Findings: There is no acute intracranial hemorrhage, large cortical   infarct, mass effect or midline shift. There is mild prominence of the   cortical sulci. The ventricles are not dilated.     There is no depressed skull fracture. There is minimal mucosal thickening   of the sinuses. The tympanomastoid region is clear.      Impression:     No acute intracranial hemorrhage or displaced skull fracture. If   clinically indicated, short-term follow-up or MRI may be obtained for   further evaluation.                GEOVANY RATLIFF M.D., ATTENDING RADIOLOGIST  This document has been electronically signed. 2019  3:30AM             (19 @ 03:33)    CT Angiography/Perfusion:  MRI Brain NC:  MRA Head/Neck:  EEG:    Assessment:  This is a 54y Female with h/o     Plan:   -   19 @ 13:44          Please contact the Neurology consult service with any questions.    Jose Antonio MD  Neurology Attending  [facility] Neurology Consult    Patient is a 54y old  Female who presents with a chief complaint of weakness (2019 04:13)    HPI:  54 Mandarin-speaking RHF, translation provided by  service, diagnosed with myasthenia gravis about 10 years ago and reports a 1-month generalized weakness resulting in difficulty standing up and maintaining balance. She is managed by neurologist, Dr. Pan Calvert, in [place], and is being treated with pyridostigmine 60mg PO TID. She had taken this on and off over the past 10 years, but resumed it regularly last month on the advice of her neurologist when her new weakness developed. Her neurologist started her on prednisone 10mg daily 3 days ago, but she has not seen any improvement.     PAST MEDICAL & SURGICAL HISTORY:  Myasthenia gravis    FAMILY HISTORY: Denied by patient    Social History: Denies tobacco, alcohol, and illicit drug use    Allergies: No Known Allergies    MEDICATIONS  (STANDING):  cefTRIAXone   IVPB      enoxaparin Injectable 40 milliGRAM(s) SubCutaneous every 24 hours  immune   globulin 10% (GAMMAGARD) IVPB 6 Gram(s) IV Intermittent daily  predniSONE   Tablet 50 milliGRAM(s) Oral daily  pyridostigmine 60 milliGRAM(s) Oral three times a day    Review of systems:    Constitutional: No fever, weight loss or fatigue    Eyes: No eye pain or discharge  ENMT:  No difficulty hearing; No sinus or throat pain  Neck: No pain or stiffness  Respiratory: No cough, wheezing, chills or hemoptysis  Cardiovascular: No chest pain, palpitations, shortness of breath, dyspnea on exertion  Gastrointestinal: No abdominal pain, nausea, vomiting or hematemesis; No diarrhea or constipation.   Genitourinary: No dysuria, frequency, hematuria or incontinence  Neurological: As per HPI  Skin: No rashes or lesions   Endocrine: No heat or cold intolerance; No hair loss  Musculoskeletal: Weakness as per HPI  Psychiatric: No depression, anxiety, mood swings  Heme/Lymph: No easy bruising or bleeding gums      Vital Signs Last 24 Hrs  T(C): 37.3 (2019 11:00), Max: 37.4 (2019 07:50)  T(F): 99.2 (2019 11:00), Max: 99.3 (2019 07:50)  HR: 65 (2019 11:00) (58 - 70)  BP: 99/61 (2019 11:00) (97/49 - 126/74)  RR: 16 (2019 11:00) (16 - 16)  SpO2: 100% (2019 11:00) (98% - 100%)    General Exam:  General: No acute distress  Respiratory: CTAB/l.  No crackles, rhonchi, or wheezes. Able to count up to 40 in one breath.  Cardiovascular: RRR, No murmurs, Full b/l radial and pedal pulses    Neurological Exam:  General / Mental Status: Oriented to person, place, and time.  No dysarthria or aphasia present.  Naming and repetition intact.  Cranial Nerves: PERRLA, No miosis or mydriasis, EOMI x 2, VFF x 4, No nystagmus or diplopia, Optic discs normal b/l.  B/l V1-V3 equal to light touch and pinprick.  Symmetric facial movement and palate elevation.  B/l hearing equal to finger rub.  5/5 strength with b/l sternocleidomastoid and trapezius.  Midline tongue protrusion with no atrophy or fasciculations.  Motor: Normal bulk and tone in all four extremities.  3/5 strength throughout bilateral lower extremities.  4/5 strength throughout bilateral upper extremities.  No downward drift, rigidity, spasticity, or tremors in any of the four extremities.  Sensation: Intact to light touch and pinprick in all four extremities.  Negative Romberg.  Coordination: No dysmetria with b/l finger-to-nose and heel-to-shin tests.  Normal rapid alternating movements b/l.  Reflexes: 1+ and symmetric at b/l pectoralis, biceps, triceps, and brachioradialis.  Absent at b/l patellae and ankles.  Toes mute b/l.  Gait: Narrow-based, cautious, with short steps. Can only take four steps without difficulty.  Unable to initiate tiptoe, heel, and tandem gaits.      Labs:   CBC Full  -  ( 2019 10:07 )  WBC Count : 9.9 K/uL  Hemoglobin : 13.7 g/dL  Hematocrit : 42.1 %  Platelet Count - Automated : 228 K/uL  Mean Cell Volume : 96.0 fl  Mean Cell Hemoglobin : 31.2 pg  Mean Cell Hemoglobin Concentration : 32.5 gm/dL  Auto Neutrophil # : 7.8 K/uL  Auto Lymphocyte # : 1.5 K/uL  Auto Monocyte # : 0.4 K/uL  Auto Eosinophil # : 0.2 K/uL  Auto Basophil # : 0.1 K/uL  Auto Neutrophil % : 79.0 %  Auto Lymphocyte % : 15.2 %  Auto Monocyte % : 3.6 %  Auto Eosinophil % : 1.6 %  Auto Basophil % : 0.8 %        141  |  108  |  13  ----------------------------<  143<H>  4.0   |  29  |  0.71    Ca    8.2<L>      2019 10:07  Phos  3.5       Mg     2.2         TPro  7.5  /  Alb  3.7  /  TBili  0.2  /  DBili  x   /  AST  27  /  ALT  32  /  AlkPhos  70      LIVER FUNCTIONS - ( 2019 01:39 )  Alb: 3.7 g/dL / Pro: 7.5 g/dL / ALK PHOS: 70 U/L / ALT: 32 U/L DA / AST: 27 U/L / GGT: x           PT/INR - ( 2019 01:39 )   PT: 9.6 sec;   INR: 0.87 ratio    PTT - ( 2019 01:39 )  PTT:31.1 sec    Urinalysis Basic - ( 2019 00:43 )  Color: Yellow / Appearance: Clear / S.010 / pH: x  Gluc: x / Ketone: Negative  / Bili: Negative / Urobili: Negative   Blood: x / Protein: Negative / Nitrite: Negative   Leuk Esterase: Moderate / RBC: 5-10 /HPF / WBC 11-25 /HPF   Sq Epi: x / Non Sq Epi: Moderate /HPF / Bacteria: Moderate /HPF  (concern for contamination with squamous cells)    Hepatitis C virus Nonreactive      Neuroimaging:    CT BRAIN (2019): No acute intracranial abnormality      Assessment:  54 RHF with likely myasthenia gravis exacerbation, likely secondary to chronic medication noncompliance; unlikely to be cholinergic crisis.      Recommendations:    1. To treat myasthenia gravis exacerbation, administer intravenous immunoglobulin (IVIg) 667 mg/kg (30 mg) IV Daily x 3 days (Total: 2000 mg/kg).    2. Continue pyridostigmine 60mg PO TID for long-term management of myasthenia gravis. Although patient reports improvement with a recent one-time dose of pyridostigmine 120mg, this is not a good sustainable dose over time.    3. Continue prednisone at a higher dose of 50mg PO daily for immunosuppression; plan to titrate as an outpatient at 6 months.    4. Start non-steroidal immunosuppressant for long-term management; this will not take full effect for 6 months, so prednisone should stay as an active medication during that time.    5. Continue negative inspiratory force and vital capacity checks Q6H.    6. Start PT/OT evaluation to guide discharge disposition.      Please contact the Neurology consult service with any questions.    Jose Antonio MD  Neurology Attending  [facility]

## 2019-02-11 NOTE — ED ADULT NURSE REASSESSMENT NOTE - NS ED NURSE REASSESS COMMENT FT1
Patient resting in bed, no acute distress noted, denies chest pain, no shortness of breath indicated. Patient admitted, awaiting bed. Safety maintained.

## 2019-02-11 NOTE — H&P ADULT - ASSESSMENT
53 y/o Mandarin F w/ PMH Myasthenia Gravis and no PSH p/w multiple falls x 1 day - admitting for concern for myasthenic crisis

## 2019-02-11 NOTE — H&P ADULT - NSHPPHYSICALEXAM_GEN_ALL_CORE
T(C): 37.1 (10 Feb 2019 23:56), Max: 37.1 (10 Feb 2019 23:56)  T(F): 98.8 (10 Feb 2019 23:56), Max: 98.8 (10 Feb 2019 23:56)  HR: 70 (10 Feb 2019 23:56) (70 - 70)  BP: 126/74 (10 Feb 2019 23:56) (126/74 - 126/74)  RR: 16 (10 Feb 2019 23:56) (16 - 16)  SpO2: 100% (10 Feb 2019 23:56) (100% - 100%)

## 2019-02-11 NOTE — H&P ADULT - PROBLEM SELECTOR PLAN 1
Likely 2/2 infection (WBC 11.6, +UA) versus less likely medication noncompliance (unclear historian)  - No clinical signs of respiratory depression or bulbar weakness  - C/w Pyridostigmine 60 TID; titrate as per Neurology  - C/w Rocephin 1 gram; f/u UCx  - Holding outpatient steroids  Neurology Dr Reich consulted

## 2019-02-11 NOTE — ED PROVIDER NOTE - OBJECTIVE STATEMENT
55 y/o F w/ PMHx of MG, who is currently on new medication, presents to ED c/o weakness. Pt reports that in the last month she has had 3 falls related to MG. Pt reports that last night, at 2245, she was walking to bathroom, when she almost fell. Pt did hurt head, no significant injury. Pt took two additional tabs of MG meds which she states noted some improvement of weakness. Pt called EMS bc of frequent episodes. Medication: pyridostigmine bromide 60mg 2 tabs t.i.d. Last Friday, pt was started on steoirds. Neurologist: Dr. Pan jones 55 y/o F w/ PMHx of MG, who is currently on new medication, presents to ED c/o weakness. Pt reports that in the last month she has had 3 falls related to MG. Pt reports that last night, at 2245, she was walking to bathroom, when she almost fell. Pt did hurt head, no significant injury. Pt took two additional tabs of MG meds which she states noted some improvement of weakness. Pt called EMS bc of frequent episodes. Medication: pyridostigmine bromide 60mg 2 tabs t.i.d. Two days ago, pt was started on steoirds. Neurologist: Dr. Pan jones 53 y/o F w/ PMHx of MG, who is currently on new medication, presents to ED c/o weakness. Pt reports that in the last month she has had 3 falls related to MG. Pt reports that last night, at 2245, she was walking to bathroom, when she almost fell. Pt did hurt head, no significant injury. Pt took two additional tabs of MG meds which she states noted some improvement of weakness. Pt called EMS bc of frequent episodes. Medication: pyridostigmine bromide 60mg 2 tabs t.i.d. Two days ago, pt was started on steoirds. Neurologist: Dr. Pan jones [phone] 55 y/o F w/ PMHx of MG, who is currently on new medication, presents to ED c/o weakness. Pt reports that in the last month she has had 3 falls related to MG. Pt reports that last night, at 2245, she was walking to bathroom, when she almost fell. Pt did hurt head, no significant injury. Pt took two additional tabs of MG meds which she states noted some improvement of weakness. Pt called EMS bc of frequent episodes. Medication: pyridostigmine bromide 60mg 2 tabs t.i.d. Two days ago, pt was started on steoirds. Neurologist: Dr. Pan jones [phone]  Mandarin  ID#[id number]

## 2019-02-11 NOTE — ED PROVIDER NOTE - MEDICAL DECISION MAKING DETAILS
55yo with MG presents with worsening diffuse weakness causing fall. will obtain labs, CThead. Will consult with neurology regarding managament.

## 2019-02-11 NOTE — ED ADULT NURSE NOTE - OBJECTIVE STATEMENT
pt complains of muscle weakness and a fall today. pt says last time pain happened was in January. pt denies n/v and is sitting comfortable.

## 2019-02-12 DIAGNOSIS — N39.0 URINARY TRACT INFECTION, SITE NOT SPECIFIED: ICD-10-CM

## 2019-02-12 LAB
ANION GAP SERPL CALC-SCNC: 3 MMOL/L — LOW (ref 5–17)
ANION GAP SERPL CALC-SCNC: 6 MMOL/L — SIGNIFICANT CHANGE UP (ref 5–17)
BUN SERPL-MCNC: 13 MG/DL — SIGNIFICANT CHANGE UP (ref 7–18)
BUN SERPL-MCNC: 14 MG/DL — SIGNIFICANT CHANGE UP (ref 7–18)
CALCIUM SERPL-MCNC: 8.3 MG/DL — LOW (ref 8.4–10.5)
CALCIUM SERPL-MCNC: 8.6 MG/DL — SIGNIFICANT CHANGE UP (ref 8.4–10.5)
CHLORIDE SERPL-SCNC: 107 MMOL/L — SIGNIFICANT CHANGE UP (ref 96–108)
CHLORIDE SERPL-SCNC: 109 MMOL/L — HIGH (ref 96–108)
CO2 SERPL-SCNC: 28 MMOL/L — SIGNIFICANT CHANGE UP (ref 22–31)
CO2 SERPL-SCNC: 28 MMOL/L — SIGNIFICANT CHANGE UP (ref 22–31)
CREAT SERPL-MCNC: 0.71 MG/DL — SIGNIFICANT CHANGE UP (ref 0.5–1.3)
CREAT SERPL-MCNC: 0.83 MG/DL — SIGNIFICANT CHANGE UP (ref 0.5–1.3)
CULTURE RESULTS: NO GROWTH — SIGNIFICANT CHANGE UP
GLUCOSE SERPL-MCNC: 104 MG/DL — HIGH (ref 70–99)
GLUCOSE SERPL-MCNC: 145 MG/DL — HIGH (ref 70–99)
HCT VFR BLD CALC: 41.6 % — SIGNIFICANT CHANGE UP (ref 34.5–45)
HGB BLD-MCNC: 13.4 G/DL — SIGNIFICANT CHANGE UP (ref 11.5–15.5)
MCHC RBC-ENTMCNC: 31 PG — SIGNIFICANT CHANGE UP (ref 27–34)
MCHC RBC-ENTMCNC: 32.2 GM/DL — SIGNIFICANT CHANGE UP (ref 32–36)
MCV RBC AUTO: 96 FL — SIGNIFICANT CHANGE UP (ref 80–100)
PLATELET # BLD AUTO: 240 K/UL — SIGNIFICANT CHANGE UP (ref 150–400)
POTASSIUM SERPL-MCNC: 3.4 MMOL/L — LOW (ref 3.5–5.3)
POTASSIUM SERPL-MCNC: 4.2 MMOL/L — SIGNIFICANT CHANGE UP (ref 3.5–5.3)
POTASSIUM SERPL-SCNC: 3.4 MMOL/L — LOW (ref 3.5–5.3)
POTASSIUM SERPL-SCNC: 4.2 MMOL/L — SIGNIFICANT CHANGE UP (ref 3.5–5.3)
RBC # BLD: 4.32 M/UL — SIGNIFICANT CHANGE UP (ref 3.8–5.2)
RBC # FLD: 12.3 % — SIGNIFICANT CHANGE UP (ref 10.3–14.5)
SODIUM SERPL-SCNC: 140 MMOL/L — SIGNIFICANT CHANGE UP (ref 135–145)
SODIUM SERPL-SCNC: 141 MMOL/L — SIGNIFICANT CHANGE UP (ref 135–145)
SPECIMEN SOURCE: SIGNIFICANT CHANGE UP
WBC # BLD: 8.2 K/UL — SIGNIFICANT CHANGE UP (ref 3.8–10.5)
WBC # FLD AUTO: 8.2 K/UL — SIGNIFICANT CHANGE UP (ref 3.8–10.5)

## 2019-02-12 PROCEDURE — 99231 SBSQ HOSP IP/OBS SF/LOW 25: CPT

## 2019-02-12 PROCEDURE — 71250 CT THORAX DX C-: CPT | Mod: 26

## 2019-02-12 PROCEDURE — 99233 SBSQ HOSP IP/OBS HIGH 50: CPT | Mod: GC

## 2019-02-12 RX ORDER — POTASSIUM CHLORIDE 20 MEQ
40 PACKET (EA) ORAL ONCE
Qty: 0 | Refills: 0 | Status: COMPLETED | OUTPATIENT
Start: 2019-02-12 | End: 2019-02-12

## 2019-02-12 RX ORDER — PANTOPRAZOLE SODIUM 20 MG/1
40 TABLET, DELAYED RELEASE ORAL
Qty: 0 | Refills: 0 | Status: DISCONTINUED | OUTPATIENT
Start: 2019-02-12 | End: 2019-02-13

## 2019-02-12 RX ADMIN — ENOXAPARIN SODIUM 40 MILLIGRAM(S): 100 INJECTION SUBCUTANEOUS at 06:02

## 2019-02-12 RX ADMIN — IMMUNE GLOBULIN (HUMAN) 100 GRAM(S): 10 INJECTION INTRAVENOUS; SUBCUTANEOUS at 14:52

## 2019-02-12 RX ADMIN — PYRIDOSTIGMINE BROMIDE 60 MILLIGRAM(S): 60 SOLUTION ORAL at 21:13

## 2019-02-12 RX ADMIN — Medication 50 MILLIGRAM(S): at 06:02

## 2019-02-12 RX ADMIN — PYRIDOSTIGMINE BROMIDE 60 MILLIGRAM(S): 60 SOLUTION ORAL at 13:13

## 2019-02-12 RX ADMIN — PYRIDOSTIGMINE BROMIDE 60 MILLIGRAM(S): 60 SOLUTION ORAL at 06:02

## 2019-02-12 RX ADMIN — CEFTRIAXONE 100 GRAM(S): 500 INJECTION, POWDER, FOR SOLUTION INTRAMUSCULAR; INTRAVENOUS at 06:04

## 2019-02-12 RX ADMIN — Medication 10 MILLIGRAM(S): at 11:51

## 2019-02-12 RX ADMIN — Medication 40 MILLIEQUIVALENT(S): at 11:51

## 2019-02-12 RX ADMIN — PYRIDOSTIGMINE BROMIDE 60 MILLIGRAM(S): 60 SOLUTION ORAL at 00:38

## 2019-02-12 RX ADMIN — AZATHIOPRINE 50 MILLIGRAM(S): 100 TABLET ORAL at 11:51

## 2019-02-12 NOTE — PROGRESS NOTE ADULT - ASSESSMENT
53yo female w/ most likely Myasthenia Gravis Exacerbation 53yo female w/ Myasthenia Gravis Exacerbation, improving with IVIg

## 2019-02-12 NOTE — PROGRESS NOTE ADULT - PROBLEM SELECTOR PLAN 1
Likely 2/2 infection (WBC 11.6, +UA) versus less likely medication noncompliance (unclear historian)  - No clinical signs of respiratory depression or bulbar weakness  - C/w Pyridostigmine 60 TID; titrate as per Neurology  - C/w Rocephin 1 gram; f/u UCx  - Holding outpatient steroids  Neurology Dr Reich consulted MG flare likely precipitated by urinary tract infection.   Symptoms currently improving, no signs of respiratory distress, w/ appropriate VC/NIF for now. There's still some UE weakness and ocular symptoms (nystagmus and strabismus) present  c/w Pyridostigmine 60 TID + Prednisone 60 mg QD + Azathioprine 50 mg QD  c/w IVIG x 3 (due for 3rd dose today)  Monitor NIF/VC q6hrs  Patient was scheduled for CT chest as OP on Monday. Will order CT chest to r/o thymoma.   Dr Reich recommendations appreciated MG flare likely precipitated by urinary tract infection.   Symptoms currently improving, no signs of respiratory distress, w/ appropriate VC/NIF for now. There's still some UE weakness and ocular symptoms (nystagmus and strabismus) present  c/w Pyridostigmine 60 TID + Prednisone 60 mg QD + Azathioprine 50 mg QD  c/w IVIG x 3 (due for 3rd dose today)  Monitor NIF/VC q6hrs  Patient was scheduled for CT chest as OP on Monday. Will order CT chest to r/o thymoma.   Dr Reich recommendations appreciated  Pending PT evaluation

## 2019-02-12 NOTE — PROGRESS NOTE ADULT - SUBJECTIVE AND OBJECTIVE BOX
PGY 1 Note discussed with supervising resident and primary attending    Patient is a 54y old  Female who presents with a chief complaint of weakness (2019 11:37)      INTERVAL HPI/OVERNIGHT EVENTS: Patient seen and examined at bedside, interviewed via [employer] . Patient states she's doing much better today, able to ambulate to the bathroom without assistance. Denies any respiratory symptoms.     MEDICATIONS  (STANDING):  azaTHIOprine 50 milliGRAM(s) Oral daily  cefTRIAXone   IVPB 1 Gram(s) IV Intermittent every 24 hours  cefTRIAXone   IVPB      enoxaparin Injectable 40 milliGRAM(s) SubCutaneous every 24 hours  immune   globulin 10% (GAMMAGARD) IVPB 10 Gram(s) IV Intermittent daily  pantoprazole    Tablet 40 milliGRAM(s) Oral before breakfast  pyridostigmine 60 milliGRAM(s) Oral three times a day    MEDICATIONS  (PRN):      __________________________________________________  REVIEW OF SYSTEMS:    CONSTITUTIONAL: No fever,   EYES: no acute visual disturbances  NECK: No pain or stiffness  RESPIRATORY: No cough; No shortness of breath  CARDIOVASCULAR: No chest pain, no palpitations  GASTROINTESTINAL: No pain. No nausea or vomiting; No diarrhea   NEUROLOGICAL: No headache or numbness, no tremors  MUSCULOSKELETAL: No joint pain, no muscle pain  GENITOURINARY: no dysuria, no frequency, no hesitancy  PSYCHIATRY: no depression , no anxiety  ALL OTHER  ROS negative        Vital Signs Last 24 Hrs  T(C): 36.5 (2019 08:10), Max: 37.4 (2019 00:37)  T(F): 97.7 (2019 08:10), Max: 99.3 (2019 00:37)  HR: 65 (2019 08:10) (63 - 77)  BP: 98/55 (2019 08:10) (98/55 - 111/65)  BP(mean): --  RR: 16 (2019 08:10) (15 - 18)  SpO2: 98% (2019 08:10) (98% - 100%)    ________________________________________________  PHYSICAL EXAM:  GENERAL: NAD  HEENT: Normocephalic; PERRLA, EOMI, conjunctivae and sclerae clear; moist mucous membranes;   NECK : supple  CHEST/LUNG: CTA B/L  HEART: S1 S2nL, no M/G/R, RRR  ABDOMEN: Soft, NT/ND; BS+  EXTREMITIES: no ECC  SKIN: warm and dry; no rash  NERVOUS SYSTEM:  AAOx3, Power 5/5 B/L extremities, SILT B/L    _________________________________________________  LABS:                        13.4   8.2   )-----------( 240      ( 2019 08:20 )             41.6     02-12    140  |  109<H>  |  13  ----------------------------<  104<H>  3.4<L>   |  28  |  0.71    Ca    8.3<L>      2019 08:20  Phos  3.5     -  Mg     2.2         TPro  7.5  /  Alb  3.7  /  TBili  0.2  /  DBili  x   /  AST  27  /  ALT  32  /  AlkPhos  70  02-    PT/INR - ( 2019 01:39 )   PT: 9.6 sec;   INR: 0.87 ratio         PTT - ( 2019 01:39 )  PTT:31.1 sec  Urinalysis Basic - ( 2019 00:43 )    Color: Yellow / Appearance: Clear / S.010 / pH: x  Gluc: x / Ketone: Negative  / Bili: Negative / Urobili: Negative   Blood: x / Protein: Negative / Nitrite: Negative   Leuk Esterase: Moderate / RBC: 5-10 /HPF / WBC 11-25 /HPF   Sq Epi: x / Non Sq Epi: Moderate /HPF / Bacteria: Moderate /HPF      CAPILLARY BLOOD GLUCOSE            RADIOLOGY & ADDITIONAL TESTS:    Consultant(s) Notes Reviewed:   YES    Care Discussed with Consultants : YES    Plan of care was discussed with patient and /or primary care giver; all questions and concerns were addressed and care was aligned with patient's wishes. yes PGY 1 Note discussed with supervising resident and primary attending    Patient is a 54y old  Female who presents with a chief complaint of weakness (2019 11:37)      INTERVAL HPI/OVERNIGHT EVENTS: Patient seen and examined at bedside, interviewed via [employer] . Patient states she's doing much better today, able to ambulate to the bathroom without assistance. Denies any respiratory symptoms.     MEDICATIONS  (STANDING):  azaTHIOprine 50 milliGRAM(s) Oral daily  cefTRIAXone   IVPB 1 Gram(s) IV Intermittent every 24 hours  cefTRIAXone   IVPB      enoxaparin Injectable 40 milliGRAM(s) SubCutaneous every 24 hours  immune   globulin 10% (GAMMAGARD) IVPB 10 Gram(s) IV Intermittent daily  pantoprazole    Tablet 40 milliGRAM(s) Oral before breakfast  pyridostigmine 60 milliGRAM(s) Oral three times a day    MEDICATIONS  (PRN):      __________________________________________________  REVIEW OF SYSTEMS:    CONSTITUTIONAL: No fever,   EYES: no acute visual disturbances  NECK: No pain or stiffness  RESPIRATORY: No cough; No shortness of breath  CARDIOVASCULAR: No chest pain, no palpitations  GASTROINTESTINAL: No pain. No nausea or vomiting; No diarrhea   NEUROLOGICAL: No headache or numbness, no tremors  MUSCULOSKELETAL: No joint pain, no muscle pain  GENITOURINARY: no dysuria, no frequency, no hesitancy  PSYCHIATRY: no depression , no anxiety  ALL OTHER  ROS negative        Vital Signs Last 24 Hrs  T(C): 36.5 (2019 08:10), Max: 37.4 (2019 00:37)  T(F): 97.7 (2019 08:10), Max: 99.3 (2019 00:37)  HR: 65 (2019 08:10) (63 - 77)  BP: 98/55 (2019 08:10) (98/55 - 111/65)  BP(mean): --  RR: 16 (2019 08:10) (15 - 18)  SpO2: 98% (2019 08:10) (98% - 100%)    ________________________________________________  PHYSICAL EXAM:  GENERAL: NAD  HEENT: Normocephalic; PERRLA, conjunctivae and sclerae clear; moist mucous membranes;   NECK : supple  CHEST/LUNG: CTA B/L  HEART: S1 S2nL, no M/G/R, RRR  ABDOMEN: Soft, NT/ND; BS+  EXTREMITIES: no ECC  SKIN: warm and dry; no rash  NERVOUS SYSTEM:  AAOx3, horizontal nystagmus right eye, Power 4/5 UE B/L, 4/5 LE B/L, SILT B/L    _________________________________________________  LABS:                        13.4   8.2   )-----------( 240      ( 2019 08:20 )             41.6     02-12    140  |  109<H>  |  13  ----------------------------<  104<H>  3.4<L>   |  28  |  0.71    Ca    8.3<L>      2019 08:20  Phos  3.5     -  Mg     2.2     -    TPro  7.5  /  Alb  3.7  /  TBili  0.2  /  DBili  x   /  AST  27  /  ALT  32  /  AlkPhos  70  02-11    PT/INR - ( 2019 01:39 )   PT: 9.6 sec;   INR: 0.87 ratio         PTT - ( 2019 01:39 )  PTT:31.1 sec  Urinalysis Basic - ( 2019 00:43 )    Color: Yellow / Appearance: Clear / S.010 / pH: x  Gluc: x / Ketone: Negative  / Bili: Negative / Urobili: Negative   Blood: x / Protein: Negative / Nitrite: Negative   Leuk Esterase: Moderate / RBC: 5-10 /HPF / WBC 11-25 /HPF   Sq Epi: x / Non Sq Epi: Moderate /HPF / Bacteria: Moderate /HPF      CAPILLARY BLOOD GLUCOSE            RADIOLOGY & ADDITIONAL TESTS:    Consultant(s) Notes Reviewed:   YES    Care Discussed with Consultants : YES    Plan of care was discussed with patient and /or primary care giver; all questions and concerns were addressed and care was aligned with patient's wishes. yes

## 2019-02-12 NOTE — PROGRESS NOTE ADULT - SUBJECTIVE AND OBJECTIVE BOX
Neurology Follow up note    Name  RASHAWN PAREDES    HPI:  55 y/o [place] F w/ PMH Myasthenia Gravis and no PSH p/w multiple falls x 1 day - last fall near 11 PM, last took Pyridostigmine at 6 PM. At the time of fall patient was unable to stand up - called her family who came and gave her 120 mg which improved her weakness. As per patient she has been taking the medications x 10 years - on and off - and recently restarted in January 2018 for worsening weakness. Of note patient began taking Prednisone ? mg since Friday. Otherwise patient denied any fevers, dysphagia, chills, CP, SOB, cough, abdominal pain, NVDC, urinary changes, or any other complaints. Attempted to reach the pharmacy to clarify medication dosing including steroid taper - did not . In the ED patient seen in NAD, no respiratory complaints, and labs noted for WBC elevation 11.6 and +UA albeit contaminated w/ squamous cells. (11 Feb 2019 04:13)      Interval History -No neurological events overnight        Subjective:  States to feel "pretty good."  Denies HA, dizziness, numbness or tingling.  States that she walked to bathroom today.      Review of Systems:  Constitutional: (+) generalized weakness. No fevers or chills                    Eyes, Ears, Mouth, Throat: No vision loss   Respiratory: No shortness of breath or cough             Cardiovascular: No chest pain or palpitations  Gastrointestinal: No nausea or vomiting                                 Genitourinary: No urinary incontinence or burning on urination  Musculoskeletal: No joint pain                                                           Dermatologic: No rash  Neurological: as per HPI                                                                      Psychiatric: No behavioral problems  Endocrine: No known hypoglycemia               Hematologic/Lymphatic: No easy bleeding    MEDICATIONS  (STANDING):  azaTHIOprine 50 milliGRAM(s) Oral daily  cefTRIAXone   IVPB 1 Gram(s) IV Intermittent every 24 hours  cefTRIAXone   IVPB      enoxaparin Injectable 40 milliGRAM(s) SubCutaneous every 24 hours  immune   globulin 10% (GAMMAGARD) IVPB 10 Gram(s) IV Intermittent daily  pantoprazole    Tablet 40 milliGRAM(s) Oral before breakfast  potassium chloride   Powder 40 milliEquivalent(s) Oral once  predniSONE   Tablet 10 milliGRAM(s) Oral once  pyridostigmine 60 milliGRAM(s) Oral three times a day    MEDICATIONS  (PRN):      Allergies    No Known Allergies    Intolerances        Objective:   Vital Signs Last 24 Hrs  T(C): 36.5 (12 Feb 2019 08:10), Max: 37.4 (12 Feb 2019 00:37)  T(F): 97.7 (12 Feb 2019 08:10), Max: 99.3 (12 Feb 2019 00:37)  HR: 65 (12 Feb 2019 08:10) (63 - 77)  BP: 98/55 (12 Feb 2019 08:10) (98/55 - 111/65)  RR: 16 (12 Feb 2019 08:10) (15 - 18)  SpO2: 98% (12 Feb 2019 08:10) (98% - 100%)    General Exam:   General appearance: No acute distress                 Cardiovascular: Pedal dorsalis pulses intact bilaterally    Neurological Exam:  Mental Status: Orientated to self, date and place.  Attention intact.  No dysarthria, aphasia or neglect.  Knowledge intact.  Registration intact.  Short and long term memory grossly intact.      Cranial Nerves: CN I - not tested.  PERRL, EOMI, VFF, no nystagmus or diplopia.  No APD.  Fundi not visualized bilaterally.  CN V1-3 intact to light touch and pinprick.  No facial asymmetry.  Hearing intact to finger rub bilaterally.  Tongue, uvula and palate midline.  Sternocleidomastoid and Trapezius intact bilaterally.    Motor:   Tone: Normal and normal bulk                  Strength: 4+/5 in RUE, 4/5 in LUE, 3/5 in RLE, and 3+/5 in LLE  Pronator drift: None                 Dysmetria: None to finger-nose-finger or heel-shin-heel  No truncal ataxia.    Tremor: No resting, postural or action tremor.  No myoclonus.    Sensation: intact to light touch and pinprick    Deep Tendon Reflexes: 1+ bilateral biceps, triceps, brachioradialis.  1+ right knee, mute left knee. B/l mute ankle  Toes mute bilaterally    Gait: normal and stable, unable to walk on toes but able to walk 15+ steps     Other:    02-12    140  |  109<H>  |  13  ----------------------------<  104<H>  3.4<L>   |  28  |  0.71    Ca    8.3<L>      12 Feb 2019 08:20  Phos  3.5     02-11  Mg     2.2     02-11    TPro  7.5  /  Alb  3.7  /  TBili  0.2  /  DBili  x   /  AST  27  /  ALT  32  /  AlkPhos  70  02-11    LIVER FUNCTIONS - ( 11 Feb 2019 01:39 )  Alb: 3.7 g/dL / Pro: 7.5 g/dL / ALK PHOS: 70 U/L / ALT: 32 U/L DA / AST: 27 U/L / GGT: x Neurology Follow up note    Name  RASHAWN PAREDES    HPI:  53 y/o [place] F w/ PMH Myasthenia Gravis and no PSH p/w multiple falls x 1 day - last fall near 11 PM, last took Pyridostigmine at 6 PM. At the time of fall patient was unable to stand up - called her family who came and gave her 120 mg which improved her weakness. As per patient she has been taking the medications x 10 years - on and off - and recently restarted in January 2018 for worsening weakness. Of note patient began taking Prednisone ? mg since Friday. Otherwise patient denied any fevers, dysphagia, chills, CP, SOB, cough, abdominal pain, NVDC, urinary changes, or any other complaints. Attempted to reach the pharmacy to clarify medication dosing including steroid taper - did not . In the ED patient seen in NAD, no respiratory complaints, and labs noted for WBC elevation 11.6 and +UA albeit contaminated w/ squamous cells. (11 Feb 2019 04:13)    Interval History -No neurological events overnight    Subjective:  States to feel "pretty good."  Denies HA, dizziness, numbness or tingling.  States that she walked to bathroom today.      Review of Systems:  Constitutional: (+) generalized weakness. No fevers or chills                    Eyes, Ears, Mouth, Throat: No vision loss   Respiratory: No shortness of breath or cough             Cardiovascular: No chest pain or palpitations  Gastrointestinal: No nausea or vomiting                                 Genitourinary: No urinary incontinence or burning on urination  Musculoskeletal: No joint pain                                                           Dermatologic: No rash  Neurological: as per HPI                                                                      Psychiatric: No behavioral problems  Endocrine: No known hypoglycemia               Hematologic/Lymphatic: No easy bleeding    MEDICATIONS  (STANDING):  azaTHIOprine 50 milliGRAM(s) Oral daily  cefTRIAXone   IVPB 1 Gram(s) IV Intermittent every 24 hours  cefTRIAXone   IVPB      enoxaparin Injectable 40 milliGRAM(s) SubCutaneous every 24 hours  immune   globulin 10% (GAMMAGARD) IVPB 10 Gram(s) IV Intermittent daily  pantoprazole    Tablet 40 milliGRAM(s) Oral before breakfast  potassium chloride   Powder 40 milliEquivalent(s) Oral once  predniSONE   Tablet 10 milliGRAM(s) Oral once  pyridostigmine 60 milliGRAM(s) Oral three times a day    Allergies  No Known Allergies      Objective:   Vital Signs Last 24 Hrs  T(C): 36.5 (12 Feb 2019 08:10), Max: 37.4 (12 Feb 2019 00:37)  T(F): 97.7 (12 Feb 2019 08:10), Max: 99.3 (12 Feb 2019 00:37)  HR: 65 (12 Feb 2019 08:10) (63 - 77)  BP: 98/55 (12 Feb 2019 08:10) (98/55 - 111/65)  RR: 16 (12 Feb 2019 08:10) (15 - 18)  SpO2: 98% (12 Feb 2019 08:10) (98% - 100%)    General Exam:   General appearance: No acute distress                 Cardiovascular: Pedal dorsalis pulses intact bilaterally    Neurological Exam:  Mental Status: Orientated to self, date and place.  Attention intact.  No dysarthria, aphasia or neglect.  Knowledge intact.  Registration intact.  Short and long term memory grossly intact.      Cranial Nerves: CN I - not tested.  PERRL, EOMI, VFF, no nystagmus or diplopia.  No APD.  Fundi not visualized bilaterally.  CN V1-3 intact to light touch and pinprick.  No facial asymmetry.  Hearing intact to finger rub bilaterally.  Tongue, uvula and palate midline.  Sternocleidomastoid and Trapezius intact bilaterally.    Motor:   Tone: Normal and normal bulk                  Strength: 4+/5 in RUE, 4/5 in LUE, 3/5 in RLE, and 3+/5 in LLE  Pronator drift: None                 Dysmetria: None to finger-nose-finger or heel-shin-heel  No truncal ataxia.    Tremor: No resting, postural or action tremor.  No myoclonus.    Sensation: intact to light touch and pinprick    Deep Tendon Reflexes: 1+ bilateral biceps, triceps, brachioradialis.  1+ right knee, mute left knee. B/l mute ankle  Toes mute bilaterally    Gait: normal and stable, unable to walk on toes but able to walk 15+ steps       Labs:    02-12    140  |  109<H>  |  13  ----------------------------<  104<H>  3.4<L>   |  28  |  0.71    Ca    8.3<L>      12 Feb 2019 08:20  Phos  3.5     02-11  Mg     2.2     02-11    TPro  7.5  /  Alb  3.7  /  TBili  0.2  /  DBili  x   /  AST  27  /  ALT  32  /  AlkPhos  70  02-11

## 2019-02-12 NOTE — PROGRESS NOTE ADULT - ATTENDING COMMENTS
Patient seen/evaluated at bedside 2/13/2019 with the [place]  and Dr. Porter. I agree with the resident progress note/outlined plan of care. My independent findings and conclusions are documented.    1. myasthenia gravis flare  2. UTI    urine cx negative, however, abx appears to to have been administered prior to collection  continue with prednisone 60mg, azithioprine 50mg and pyridostigmine  final dose of Ivig tomorrow  to complete ceftriaxone tomorrow

## 2019-02-12 NOTE — PROGRESS NOTE ADULT - PROBLEM SELECTOR PLAN 1
Continue and complete 3 doses of IVIG 10mg daily    Continue Pyridostigmine 60mg PO TID for longterm mgmt    Increase Prednisone 60mg PO daily.  For TODAY ONLY please give an additional 10mg of Prednisone to equal the increased daily dose of 60mg.  Additionally, Prednisone 60mg PO daily should not be tapered.  Therefore, continue Prednisone 60mg PO daily upon dc.  Prednisone should be tapered as outpt by Neurologist-Dr. Pan Calvert # [phone].    PT/OT Continue and complete 3 doses of IVIG 10mg daily    Continue Pyridostigmine 60mg PO TID for long-term mgmt    Increase Prednisone 60mg PO daily.  For TODAY ONLY please give an additional 10mg of Prednisone to equal the increased daily dose of 60mg.  Additionally, Prednisone 60mg PO daily should not be tapered.  Therefore, continue Prednisone 60mg PO daily upon dc.  Prednisone should be tapered as outpt by Neurologist-Dr. Pan Calvert # [phone].    Continue azathioprine 150mg daily for long-term non-steroidal immunosuppression.    PT/OT

## 2019-02-12 NOTE — PROGRESS NOTE ADULT - PROBLEM SELECTOR PLAN 2
IMPROVE VTE Individual Risk Assessment    RISK                                                          Points  [] Previous VTE                                           3  [] Thrombophilia                                        2  [] Lower limb paralysis                              2   [] Current Cancer                                       2   [x] Immobilization > 24 hrs                        1  [] ICU/CCU stay > 24 hours                       1  [] Age > 60                                                   1    IMPROVE VTE Score: 1, no indication for DVT PPx Patient currently asymptomatic, afebrile, no leukocytosis.   UCx NGTD  Will c/w Rocephin for a total of 3 days (D2)

## 2019-02-12 NOTE — PROGRESS NOTE ADULT - ASSESSMENT
55 y/o [place] F w/ PMH Myasthenia Gravis and no PSH p/w multiple falls x 1 day - admitting for concern for myasthenic crisis 54F w/ PMHx Myasthenia Gravis (diagnosed in 2008, initially presenting w/ L eye ptosis, never intubated or admitted to ICU) p/w generalized weakness and multiple falls for one day.  Patient admitted to medicine for MG flare.

## 2019-02-13 ENCOUNTER — TRANSCRIPTION ENCOUNTER (OUTPATIENT)
Age: 54
End: 2019-02-13

## 2019-02-13 VITALS
DIASTOLIC BLOOD PRESSURE: 53 MMHG | RESPIRATION RATE: 16 BRPM | HEART RATE: 63 BPM | SYSTOLIC BLOOD PRESSURE: 104 MMHG | OXYGEN SATURATION: 88 % | TEMPERATURE: 99 F

## 2019-02-13 LAB
ANION GAP SERPL CALC-SCNC: 6 MMOL/L — SIGNIFICANT CHANGE UP (ref 5–17)
BASOPHILS # BLD AUTO: 0.02 K/UL — SIGNIFICANT CHANGE UP (ref 0–0.2)
BASOPHILS NFR BLD AUTO: 0.3 % — SIGNIFICANT CHANGE UP (ref 0–2)
BUN SERPL-MCNC: 17 MG/DL — SIGNIFICANT CHANGE UP (ref 7–18)
CALCIUM SERPL-MCNC: 8 MG/DL — LOW (ref 8.4–10.5)
CHLORIDE SERPL-SCNC: 108 MMOL/L — SIGNIFICANT CHANGE UP (ref 96–108)
CO2 SERPL-SCNC: 27 MMOL/L — SIGNIFICANT CHANGE UP (ref 22–31)
CREAT SERPL-MCNC: 0.69 MG/DL — SIGNIFICANT CHANGE UP (ref 0.5–1.3)
EOSINOPHIL # BLD AUTO: 0.1 K/UL — SIGNIFICANT CHANGE UP (ref 0–0.5)
EOSINOPHIL NFR BLD AUTO: 1.4 % — SIGNIFICANT CHANGE UP (ref 0–6)
GLUCOSE SERPL-MCNC: 101 MG/DL — HIGH (ref 70–99)
HCT VFR BLD CALC: 36.8 % — SIGNIFICANT CHANGE UP (ref 34.5–45)
HGB BLD-MCNC: 12.1 G/DL — SIGNIFICANT CHANGE UP (ref 11.5–15.5)
IMM GRANULOCYTES NFR BLD AUTO: 0.4 % — SIGNIFICANT CHANGE UP (ref 0–1.5)
LYMPHOCYTES # BLD AUTO: 2.44 K/UL — SIGNIFICANT CHANGE UP (ref 1–3.3)
LYMPHOCYTES # BLD AUTO: 34.5 % — SIGNIFICANT CHANGE UP (ref 13–44)
MAGNESIUM SERPL-MCNC: 2.1 MG/DL — SIGNIFICANT CHANGE UP (ref 1.6–2.6)
MCHC RBC-ENTMCNC: 31.3 PG — SIGNIFICANT CHANGE UP (ref 27–34)
MCHC RBC-ENTMCNC: 32.9 GM/DL — SIGNIFICANT CHANGE UP (ref 32–36)
MCV RBC AUTO: 95.3 FL — SIGNIFICANT CHANGE UP (ref 80–100)
MONOCYTES # BLD AUTO: 0.39 K/UL — SIGNIFICANT CHANGE UP (ref 0–0.9)
MONOCYTES NFR BLD AUTO: 5.5 % — SIGNIFICANT CHANGE UP (ref 2–14)
NEUTROPHILS # BLD AUTO: 4.09 K/UL — SIGNIFICANT CHANGE UP (ref 1.8–7.4)
NEUTROPHILS NFR BLD AUTO: 57.9 % — SIGNIFICANT CHANGE UP (ref 43–77)
NRBC # BLD: 0 /100 WBCS — SIGNIFICANT CHANGE UP (ref 0–0)
PHOSPHATE SERPL-MCNC: 4 MG/DL — SIGNIFICANT CHANGE UP (ref 2.5–4.5)
PLATELET # BLD AUTO: 221 K/UL — SIGNIFICANT CHANGE UP (ref 150–400)
POTASSIUM SERPL-MCNC: 3.6 MMOL/L — SIGNIFICANT CHANGE UP (ref 3.5–5.3)
POTASSIUM SERPL-SCNC: 3.6 MMOL/L — SIGNIFICANT CHANGE UP (ref 3.5–5.3)
RBC # BLD: 3.86 M/UL — SIGNIFICANT CHANGE UP (ref 3.8–5.2)
RBC # FLD: 13.1 % — SIGNIFICANT CHANGE UP (ref 10.3–14.5)
SODIUM SERPL-SCNC: 141 MMOL/L — SIGNIFICANT CHANGE UP (ref 135–145)
WBC # BLD: 7.07 K/UL — SIGNIFICANT CHANGE UP (ref 3.8–10.5)
WBC # FLD AUTO: 7.07 K/UL — SIGNIFICANT CHANGE UP (ref 3.8–10.5)

## 2019-02-13 PROCEDURE — 82962 GLUCOSE BLOOD TEST: CPT

## 2019-02-13 PROCEDURE — 86041 ACETYLCHOLN RCPTR BNDNG ANTB: CPT

## 2019-02-13 PROCEDURE — 84702 CHORIONIC GONADOTROPIN TEST: CPT

## 2019-02-13 PROCEDURE — 71046 X-RAY EXAM CHEST 2 VIEWS: CPT

## 2019-02-13 PROCEDURE — 90686 IIV4 VACC NO PRSV 0.5 ML IM: CPT

## 2019-02-13 PROCEDURE — 99285 EMERGENCY DEPT VISIT HI MDM: CPT | Mod: 25

## 2019-02-13 PROCEDURE — 71250 CT THORAX DX C-: CPT

## 2019-02-13 PROCEDURE — 87086 URINE CULTURE/COLONY COUNT: CPT

## 2019-02-13 PROCEDURE — 84100 ASSAY OF PHOSPHORUS: CPT

## 2019-02-13 PROCEDURE — 82746 ASSAY OF FOLIC ACID SERUM: CPT

## 2019-02-13 PROCEDURE — 85027 COMPLETE CBC AUTOMATED: CPT

## 2019-02-13 PROCEDURE — 80053 COMPREHEN METABOLIC PANEL: CPT

## 2019-02-13 PROCEDURE — 99231 SBSQ HOSP IP/OBS SF/LOW 25: CPT

## 2019-02-13 PROCEDURE — 83036 HEMOGLOBIN GLYCOSYLATED A1C: CPT

## 2019-02-13 PROCEDURE — 80048 BASIC METABOLIC PNL TOTAL CA: CPT

## 2019-02-13 PROCEDURE — 84443 ASSAY THYROID STIM HORMONE: CPT

## 2019-02-13 PROCEDURE — 86803 HEPATITIS C AB TEST: CPT

## 2019-02-13 PROCEDURE — 85730 THROMBOPLASTIN TIME PARTIAL: CPT

## 2019-02-13 PROCEDURE — 85610 PROTHROMBIN TIME: CPT

## 2019-02-13 PROCEDURE — 36415 COLL VENOUS BLD VENIPUNCTURE: CPT

## 2019-02-13 PROCEDURE — 83735 ASSAY OF MAGNESIUM: CPT

## 2019-02-13 PROCEDURE — 70450 CT HEAD/BRAIN W/O DYE: CPT

## 2019-02-13 PROCEDURE — 94150 VITAL CAPACITY TEST: CPT

## 2019-02-13 PROCEDURE — 82607 VITAMIN B-12: CPT

## 2019-02-13 PROCEDURE — 99239 HOSP IP/OBS DSCHRG MGMT >30: CPT

## 2019-02-13 PROCEDURE — 93005 ELECTROCARDIOGRAM TRACING: CPT

## 2019-02-13 PROCEDURE — 97162 PT EVAL MOD COMPLEX 30 MIN: CPT

## 2019-02-13 PROCEDURE — 80061 LIPID PANEL: CPT

## 2019-02-13 PROCEDURE — 81001 URINALYSIS AUTO W/SCOPE: CPT

## 2019-02-13 PROCEDURE — 84484 ASSAY OF TROPONIN QUANT: CPT

## 2019-02-13 RX ORDER — PYRIDOSTIGMINE BROMIDE 60 MG/5ML
1 SOLUTION ORAL
Qty: 90 | Refills: 0
Start: 2019-02-13 | End: 2019-03-14

## 2019-02-13 RX ORDER — AZATHIOPRINE 100 MG/1
1 TABLET ORAL
Qty: 30 | Refills: 0
Start: 2019-02-13 | End: 2019-03-14

## 2019-02-13 RX ORDER — POTASSIUM CHLORIDE 20 MEQ
40 PACKET (EA) ORAL ONCE
Qty: 0 | Refills: 0 | Status: DISCONTINUED | OUTPATIENT
Start: 2019-02-13 | End: 2019-02-13

## 2019-02-13 RX ORDER — PYRIDOSTIGMINE BROMIDE 60 MG/5ML
2 SOLUTION ORAL
Qty: 0 | Refills: 0 | COMMUNITY

## 2019-02-13 RX ORDER — PANTOPRAZOLE SODIUM 20 MG/1
1 TABLET, DELAYED RELEASE ORAL
Qty: 30 | Refills: 0
Start: 2019-02-13 | End: 2019-03-14

## 2019-02-13 RX ORDER — INFLUENZA VIRUS VACCINE 15; 15; 15; 15 UG/.5ML; UG/.5ML; UG/.5ML; UG/.5ML
0.5 SUSPENSION INTRAMUSCULAR ONCE
Qty: 0 | Refills: 0 | Status: COMPLETED | OUTPATIENT
Start: 2019-02-13 | End: 2019-02-13

## 2019-02-13 RX ADMIN — PYRIDOSTIGMINE BROMIDE 60 MILLIGRAM(S): 60 SOLUTION ORAL at 14:08

## 2019-02-13 RX ADMIN — ENOXAPARIN SODIUM 40 MILLIGRAM(S): 100 INJECTION SUBCUTANEOUS at 05:38

## 2019-02-13 RX ADMIN — INFLUENZA VIRUS VACCINE 0.5 MILLILITER(S): 15; 15; 15; 15 SUSPENSION INTRAMUSCULAR at 15:59

## 2019-02-13 RX ADMIN — CEFTRIAXONE 100 GRAM(S): 500 INJECTION, POWDER, FOR SOLUTION INTRAMUSCULAR; INTRAVENOUS at 05:39

## 2019-02-13 RX ADMIN — PYRIDOSTIGMINE BROMIDE 60 MILLIGRAM(S): 60 SOLUTION ORAL at 05:38

## 2019-02-13 RX ADMIN — AZATHIOPRINE 50 MILLIGRAM(S): 100 TABLET ORAL at 11:20

## 2019-02-13 RX ADMIN — PANTOPRAZOLE SODIUM 40 MILLIGRAM(S): 20 TABLET, DELAYED RELEASE ORAL at 05:38

## 2019-02-13 RX ADMIN — Medication 60 MILLIGRAM(S): at 05:38

## 2019-02-13 NOTE — DISCHARGE NOTE ADULT - PATIENT PORTAL LINK FT
You can access the [facility] Patient Portal, offered by [facility], by registering with the following website: [email or web address]

## 2019-02-13 NOTE — PROGRESS NOTE ADULT - ASSESSMENT
54F w/ PMHx Myasthenia Gravis (diagnosed in 2008, initially presenting w/ L eye ptosis, never intubated or admitted to ICU) p/w generalized weakness and multiple falls for one day.  Patient admitted to medicine for MG flare.

## 2019-02-13 NOTE — DISCHARGE NOTE ADULT - PLAN OF CARE
Medication compliance and resolution of flare. You were admitted to the hospital for Myasthenia gravis flare likely precipitated by infection. You were treated with IVIG (total of three doses). You were evaluated by Dr Reich (neurologist). You will continue with Pyridostigmine, Prednisone and Azathioprine as prescribed. CT chest was negative for thymoma. You will follow up with neurologist clinic upon discharge for close follow up and slow taper of steroids and adjust of medications if clinically indicated. Clinic information attached below. You were diagnosed with urinary tract infection, urine culture was negative and you completed three days of IV antibiotic. You were admitted to the hospital for Myasthenia gravis flare likely precipitated by infection. You were treated with IVIG (total of three doses). You were evaluated by Dr Reich (neurologist). You will continue with Pyridostigmine, Prednisone and Azathioprine as prescribed. CT chest was negative for thymoma. You will follow up with your neurologist clinic on Friday for close follow up and slow taper of steroids and adjust of medications if clinically indicated. You had CT chest and were found to have bilateral thyroid nodules, the largest one measures 1.1 cm within the right lobe. You were instructed via Mandarin  to follow up with PCP for further evaluation, preferably with Ultrasound of the thyroid. You will need Thyroid hormone levels once off steroids.

## 2019-02-13 NOTE — DISCHARGE NOTE ADULT - CARE PLAN
Principal Discharge DX:	Myasthenia gravis  Goal:	Medication compliance and resolution of flare.  Assessment and plan of treatment:	You were admitted to the hospital for Myasthenia gravis flare likely precipitated by infection. You were treated with IVIG (total of three doses). You were evaluated by Dr Reich (neurologist). You will continue with Pyridostigmine, Prednisone and Azathioprine as prescribed. CT chest was negative for thymoma. You will follow up with neurologist clinic upon discharge for close follow up and slow taper of steroids and adjust of medications if clinically indicated. Clinic information attached below.  Secondary Diagnosis:	UTI (urinary tract infection)  Assessment and plan of treatment:	You were diagnosed with urinary tract infection, urine culture was negative and you completed three days of IV antibiotic. Principal Discharge DX:	Myasthenia gravis  Goal:	Medication compliance and resolution of flare.  Assessment and plan of treatment:	You were admitted to the hospital for Myasthenia gravis flare likely precipitated by infection. You were treated with IVIG (total of three doses). You were evaluated by Dr Reich (neurologist). You will continue with Pyridostigmine, Prednisone and Azathioprine as prescribed. CT chest was negative for thymoma. You will follow up with your neurologist clinic on Friday for close follow up and slow taper of steroids and adjust of medications if clinically indicated.  Secondary Diagnosis:	UTI (urinary tract infection)  Assessment and plan of treatment:	You were diagnosed with urinary tract infection, urine culture was negative and you completed three days of IV antibiotic.  Secondary Diagnosis:	Thyroid nodule  Assessment and plan of treatment:	You had CT chest and were found to have bilateral thyroid nodules, the largest one measures 1.1 cm within the right lobe. You were instructed via Mandarin  to follow up with PCP for further evaluation, preferably with Ultrasound of the thyroid. You will need Thyroid hormone levels once off steroids.

## 2019-02-13 NOTE — DISCHARGE NOTE ADULT - CARE PROVIDER_API CALL
Jose Daniel Reich (MD)  Clinical Neurophysiology; Neurology  [address], [address] [place] [address]  Phone: [phone]  Fax: [phone]  Follow Up Time:

## 2019-02-13 NOTE — PROGRESS NOTE ADULT - SUBJECTIVE AND OBJECTIVE BOX
Neurology Follow up note    Name  RASHAWN PAREDES    HPI:  55 y/o Mand[place] F w/ PMH Myasthenia Gravis and no PSH p/w multiple falls x 1 day - last fall near 11 PM, last took Pyridostigmine at 6 PM. At the time of fall patient was unable to stand up - called her family who came and gave her 120 mg which improved her weakness. As per patient she has been taking the medications x 10 years - on and off - and recently restarted in January 2018 for worsening weakness. Of note patient began taking Prednisone ? mg since Friday. Otherwise patient denied any fevers, dysphagia, chills, CP, SOB, cough, abdominal pain, NVDC, urinary changes, or any other complaints. Attempted to reach the pharmacy to clarify medication dosing including steroid taper - did not . In the ED patient seen in NAD, no respiratory complaints, and labs noted for WBC elevation 11.6 and +UA albeit contaminated w/ squamous cells. (11 Feb 2019 04:13)      Interval History -No neurological events overnight    Subjective:  States "I feel much better today."  Reports walking the hallway several times today and is now taking a break.  Denies HA, dizziness, or numbness.  [place] -Kaylah #[id number].    Review of Systems:  Constitutional: Denies generalized weakness. No fevers or chills                    Eyes, Ears, Mouth, Throat: No vision loss   Respiratory: No shortness of breath or cough                                Cardiovascular: No chest pain or palpitations  Gastrointestinal: No nausea or vomiting                                         Genitourinary: No urinary incontinence or burning on urination  Musculoskeletal: No joint pain                                                           Dermatologic: No rash  Neurological: as per HPI                                                                      Psychiatric: No behavioral problems  Endocrine: No known hypoglycemia               Hematologic/Lymphatic: No easy bleeding    MEDICATIONS  (STANDING):  azaTHIOprine 50 milliGRAM(s) Oral daily  enoxaparin Injectable 40 milliGRAM(s) SubCutaneous every 24 hours  pantoprazole    Tablet 40 milliGRAM(s) Oral before breakfast  potassium chloride   Powder 40 milliEquivalent(s) Oral once  predniSONE   Tablet 60 milliGRAM(s) Oral daily  pyridostigmine 60 milliGRAM(s) Oral three times a day    MEDICATIONS  (PRN):      Allergies    No Known Allergies    Intolerances        Objective:   Vital Signs Last 24 Hrs  T(C): 36.9 (13 Feb 2019 07:45), Max: 37.3 (13 Feb 2019 00:22)  T(F): 98.4 (13 Feb 2019 07:45), Max: 99.1 (13 Feb 2019 00:22)  HR: 57 (13 Feb 2019 07:45) (57 - 66)  BP: 100/- (13 Feb 2019 07:45) (99/49 - 100/56)  RR: 16 (13 Feb 2019 07:45) (16 - 16)  SpO2: 98% (13 Feb 2019 07:45) (98% - 100%)    General Exam:   General appearance: No acute distress                 Cardiovascular: Pedal dorsalis pulses intact bilaterally    Neurological Exam:  Mental Status: Orientated to self, date and place.  Attention intact.  No dysarthria, aphasia or neglect.  Knowledge intact.  Registration intact.  Short and long term memory grossly intact.      Cranial Nerves: CN I - not tested.  PERRL, EOMI, VFF, no nystagmus or diplopia.  No APD.  CN V1-3 intact to light touch and pinprick.  No facial asymmetry.  Hearing intact to finger rub bilaterally.  Tongue, uvula and palate midline.  Sternocleidomastoid and Trapezius intact bilaterally.    Motor:   Tone: Normal and normal bulk                  Strength: 5/5 b/l in upper extremities, 3+/5 in RLE, 4/5 in LLE  Pronator drift: none                 Dysmetria: None to finger-nose-finger   No truncal ataxia.    Tremor: No resting, postural or action tremor.  No myoclonus.    Sensation: intact to light touch and pinprick    Deep Tendon Reflexes: 1+ bilateral biceps, triceps, brachioradialis.  1+right knee, mute left knee.  B/l mute ankle  Toes mute bilaterally    Gait: normal and stable.      Other:    02-13    141  |  108  |  17  ----------------------------<  101<H>  3.6   |  27  |  0.69    Ca    8.0<L>      13 Feb 2019 06:41  Phos  4.0     02-13  Mg     2.1     02-13 Neurology Follow up note    Name  RASHAWN PAREDES    HPI:  55 y/o [place] F w/ PMH Myasthenia Gravis and no PSH p/w multiple falls x 1 day - last fall near 11 PM, last took Pyridostigmine at 6 PM. At the time of fall patient was unable to stand up - called her family who came and gave her 120 mg which improved her weakness. As per patient she has been taking the medications x 10 years - on and off - and recently restarted in January 2018 for worsening weakness. Of note patient began taking Prednisone ? mg since Friday. Otherwise patient denied any fevers, dysphagia, chills, CP, SOB, cough, abdominal pain, NVDC, urinary changes, or any other complaints. Attempted to reach the pharmacy to clarify medication dosing including steroid taper - did not . In the ED patient seen in NAD, no respiratory complaints, and labs noted for WBC elevation 11.6 and +UA albeit contaminated w/ squamous cells. (11 Feb 2019 04:13)    Interval History -No neurological events overnight    Subjective:  States "I feel much better today."  Reports walking the hallway several times today and is now taking a break.  Denies HA, dizziness, or numbness.  [place] -Kaylah #[id number].    Review of Systems:  Constitutional: Denies generalized weakness. No fevers or chills                    Eyes, Ears, Mouth, Throat: No vision loss   Respiratory: No shortness of breath or cough                                Cardiovascular: No chest pain or palpitations  Gastrointestinal: No nausea or vomiting                                         Genitourinary: No urinary incontinence or burning on urination  Musculoskeletal: No joint pain                                                           Dermatologic: No rash  Neurological: as per HPI                                                                      Psychiatric: No behavioral problems  Endocrine: No known hypoglycemia               Hematologic/Lymphatic: No easy bleeding    MEDICATIONS  (STANDING):  azaTHIOprine 50 milliGRAM(s) Oral daily  enoxaparin Injectable 40 milliGRAM(s) SubCutaneous every 24 hours  pantoprazole    Tablet 40 milliGRAM(s) Oral before breakfast  potassium chloride   Powder 40 milliEquivalent(s) Oral once  predniSONE   Tablet 60 milliGRAM(s) Oral daily  pyridostigmine 60 milliGRAM(s) Oral three times a day    Allergies  No Known Allergies    Objective:   Vital Signs Last 24 Hrs  T(C): 36.9 (13 Feb 2019 07:45), Max: 37.3 (13 Feb 2019 00:22)  T(F): 98.4 (13 Feb 2019 07:45), Max: 99.1 (13 Feb 2019 00:22)  HR: 57 (13 Feb 2019 07:45) (57 - 66)  BP: 100/- (13 Feb 2019 07:45) (99/49 - 100/56)  RR: 16 (13 Feb 2019 07:45) (16 - 16)  SpO2: 98% (13 Feb 2019 07:45) (98% - 100%)    General Exam:   General appearance: No acute distress                 Cardiovascular: Pedal dorsalis pulses intact bilaterally    Neurological Exam:  Mental Status: Orientated to self, date and place.  Attention intact.  No dysarthria, aphasia or neglect.  Knowledge intact.  Registration intact.  Short and long term memory grossly intact.      Cranial Nerves: CN I - not tested.  PERRL, EOMI, VFF, no nystagmus or diplopia.  No APD.  CN V1-3 intact to light touch and pinprick.  No facial asymmetry.  Hearing intact to finger rub bilaterally.  Tongue, uvula and palate midline.  Sternocleidomastoid and Trapezius intact bilaterally.    Motor:   Tone: Normal and normal bulk                  Strength: 5/5 b/l in upper extremities, 3+/5 in RLE, 4/5 in LLE  Pronator drift: none                 Dysmetria: None to finger-nose-finger   No truncal ataxia.    Tremor: No resting, postural or action tremor.  No myoclonus.    Sensation: intact to light touch and pinprick    Deep Tendon Reflexes: 1+ bilateral biceps, triceps, brachioradialis.  1+right knee, mute left knee.  B/l mute ankle.  Toes mute bilaterally    Gait: normal and stable.        Labs:    02-13    141  |  108  |  17  ----------------------------<  101<H>  3.6   |  27  |  0.69    Ca    8.0<L>      13 Feb 2019 06:41  Phos  4.0     02-13  Mg     2.1     02-13

## 2019-02-13 NOTE — DISCHARGE NOTE ADULT - MEDICATION SUMMARY - MEDICATIONS TO STOP TAKING
I will STOP taking the medications listed below when I get home from the hospital:    PREDNISONE 10 MG TABLET    PYRIDOSTIGMINE BR 60 MG TABLET  -- 2  by mouth 3 times a day

## 2019-02-13 NOTE — DISCHARGE NOTE ADULT - MEDICATION SUMMARY - MEDICATIONS TO TAKE
I will START or STAY ON the medications listed below when I get home from the hospital:    predniSONE 20 mg oral tablet  -- 3 tab(s) by mouth once a day  -- Indication: For Myasthenia gravis flare    pyridostigmine 60 mg oral tablet  -- 1 tab(s) by mouth 3 times a day  -- Indication: For Myasthenia gravis flare    azaTHIOprine 50 mg oral tablet  -- 1 tab(s) by mouth once a day  -- Indication: For Myasthenia gravis flare    pantoprazole 40 mg oral delayed release tablet  -- 1 tab(s) by mouth once a day (before a meal)  -- Indication: For gastroprotective

## 2019-02-13 NOTE — PROGRESS NOTE ADULT - PROBLEM SELECTOR PLAN 1
Completed IVIG    Continue PT and dc planning    No changes to Pyridostigmine, Azathioprine or Prednisone.  Pt should f/u w/ out pt Neurologist-Dr. Calvert for tapering medications. Completed IVIG    Continue PT and dc planning    No changes to Pyridostigmine, Azathioprine or Prednisone.  Pt should f/u w/ outpt Neurologist - Dr. Calvert - for tapering Prednisone dose over 6 months.

## 2019-02-13 NOTE — PROGRESS NOTE ADULT - SUBJECTIVE AND OBJECTIVE BOX
PGY 1 Note discussed with supervising resident and primary attending    Patient is a 54y old  Female who presents with a chief complaint of weakness (12 Feb 2019 12:06)      INTERVAL HPI/OVERNIGHT EVENTS: offers no new complaints; current symptoms resolving    MEDICATIONS  (STANDING):  azaTHIOprine 50 milliGRAM(s) Oral daily  enoxaparin Injectable 40 milliGRAM(s) SubCutaneous every 24 hours  pantoprazole    Tablet 40 milliGRAM(s) Oral before breakfast  potassium chloride   Powder 40 milliEquivalent(s) Oral once  predniSONE   Tablet 60 milliGRAM(s) Oral daily  pyridostigmine 60 milliGRAM(s) Oral three times a day    MEDICATIONS  (PRN):      __________________________________________________  REVIEW OF SYSTEMS:    CONSTITUTIONAL: No fever,   EYES: no acute visual disturbances  NECK: No pain or stiffness  RESPIRATORY: No cough; No shortness of breath  CARDIOVASCULAR: No chest pain, no palpitations  GASTROINTESTINAL: No pain. No nausea or vomiting; No diarrhea   NEUROLOGICAL: No headache or numbness, no tremors  MUSCULOSKELETAL: No joint pain, no muscle pain  GENITOURINARY: no dysuria, no frequency, no hesitancy  PSYCHIATRY: no depression , no anxiety  ALL OTHER  ROS negative        Vital Signs Last 24 Hrs  T(C): 36.9 (13 Feb 2019 07:45), Max: 37.3 (13 Feb 2019 00:22)  T(F): 98.4 (13 Feb 2019 07:45), Max: 99.1 (13 Feb 2019 00:22)  HR: 57 (13 Feb 2019 07:45) (57 - 68)  BP: 100/- (13 Feb 2019 07:45) (99/49 - 100/56)  BP(mean): --  RR: 16 (13 Feb 2019 07:45) (16 - 16)  SpO2: 98% (13 Feb 2019 07:45) (98% - 100%)    ________________________________________________  PHYSICAL EXAM:  GENERAL: NAD  HEENT: Normocephalic;  conjunctivae and sclerae clear; moist mucous membranes;   NECK : supple  CHEST/LUNG: CTA B/L  HEART: S1 S2nL, no M/G/R, RRR  ABDOMEN: Soft, Nontender, Nondistended; BS+  EXTREMITIES: no ECC  SKIN: warm and dry; no rash  NERVOUS SYSTEM:  AAOx3    _________________________________________________  LABS:                        12.1   7.07  )-----------( 221      ( 13 Feb 2019 06:41 )             36.8     02-13    141  |  108  |  17  ----------------------------<  101<H>  3.6   |  27  |  0.69    Ca    8.0<L>      13 Feb 2019 06:41  Phos  4.0     02-13  Mg     2.1     02-13          CAPILLARY BLOOD GLUCOSE            RADIOLOGY & ADDITIONAL TESTS:    < from: CT Chest No Cont (02.12.19 @ 22:00) >  NTERPRETATION:    CLINICAL HISTORY:   Myasthenia gravis , rule out thymoma    TECHNIQUE:    Axial computed tomography images of the chest without intravenous   contrast.    Coronal and sagittal reformatted images were created and reviewed.    MIP reconstructed images were created and reviewed.     COMPARISON:    CR XR CHEST PA AND LATERAL 2/11/2019 2:43 AM     FINDINGS:    Thyroid:  Bilateral thyroid nodules , the largest one measures 1.1 cm   within the right lobe.   Lungs: Patent central airways without bronchial wall thickening or   bronchiectasis. No consolidation or pulmonary edema. No suspicious lung   nodule.   Pleural space: Normal. No pneumothorax. No pleural effusion.    Heart: Normal. No cardiomegaly. No pericardial effusion.    Aorta: Normal. No aortic aneurysm.   Mediastinum: No mediastinal mass or thymoma.   Lymph nodes: No lymphadenopathy. Few subcentimeter left supraclavicular   lymph nodes.   Bones/joints: Unremarkable. No acute fracture.    Soft tissues: Unremarkable.    Liver:  Too small to characterize hypodensities within the liver likely   benign.    IMPRESSION:     1.  No mediastinal mass or thymoma.    2.  Clear lungs.     3.  Bilateral thyroid nodules, the largest one measures 1.1 cm within the   right lobe. Recommend dedicated exam. Ultrasound of the thyroid.                OLGA ALEJO M.D., ATTENDING RADIOLOGIST  This document has been electronically signed. Feb 13 20198:33AM                    Imaging Personally Reviewed:  YES  Consultant(s) Notes Reviewed:   YES    Care Discussed with Consultants : YES    Plan of care was discussed with patient and /or primary care giver; all questions and concerns were addressed and care was aligned with patient's wishes. YES PGY 1 Note discussed with supervising resident and primary attending    Patient is a 54y old  Female who presents with a chief complaint of weakness (12 Feb 2019 12:06)      INTERVAL HPI/OVERNIGHT EVENTS: offers no new complaints; current symptoms resolving    MEDICATIONS  (STANDING):  azaTHIOprine 50 milliGRAM(s) Oral daily  enoxaparin Injectable 40 milliGRAM(s) SubCutaneous every 24 hours  pantoprazole    Tablet 40 milliGRAM(s) Oral before breakfast  potassium chloride   Powder 40 milliEquivalent(s) Oral once  predniSONE   Tablet 60 milliGRAM(s) Oral daily  pyridostigmine 60 milliGRAM(s) Oral three times a day    MEDICATIONS  (PRN):      __________________________________________________  REVIEW OF SYSTEMS:    CONSTITUTIONAL: No fever,   EYES: no acute visual disturbances  NECK: No pain or stiffness  RESPIRATORY: No cough; No shortness of breath  CARDIOVASCULAR: No chest pain, no palpitations  GASTROINTESTINAL: No pain. No nausea or vomiting; No diarrhea   NEUROLOGICAL: No headache or numbness, no tremors  MUSCULOSKELETAL: No joint pain, no muscle pain  GENITOURINARY: no dysuria, no frequency, no hesitancy  PSYCHIATRY: no depression , no anxiety  ALL OTHER  ROS negative        Vital Signs Last 24 Hrs  T(C): 36.9 (13 Feb 2019 07:45), Max: 37.3 (13 Feb 2019 00:22)  T(F): 98.4 (13 Feb 2019 07:45), Max: 99.1 (13 Feb 2019 00:22)  HR: 57 (13 Feb 2019 07:45) (57 - 68)  BP: 100/- (13 Feb 2019 07:45) (99/49 - 100/56)  BP(mean): --  RR: 16 (13 Feb 2019 07:45) (16 - 16)  SpO2: 98% (13 Feb 2019 07:45) (98% - 100%)    ________________________________________________  PHYSICAL EXAM:  GENERAL: NAD  HEENT: Normocephalic;  conjunctivae and sclerae clear; moist mucous membranes;   NECK : supple  CHEST/LUNG: CTA B/L  HEART: S1 S2nL, no M/G/R, RRR  ABDOMEN: Soft, Nontender, Nondistended; BS+  EXTREMITIES: no ECC  SKIN: warm and dry; no rash  NERVOUS SYSTEM:  AAOx3, left eye strabismus, power 5/5 UE B/L, 4/5 LE B/L. SILT B/L    _________________________________________________  LABS:                        12.1   7.07  )-----------( 221      ( 13 Feb 2019 06:41 )             36.8     02-13    141  |  108  |  17  ----------------------------<  101<H>  3.6   |  27  |  0.69    Ca    8.0<L>      13 Feb 2019 06:41  Phos  4.0     02-13  Mg     2.1     02-13          CAPILLARY BLOOD GLUCOSE            RADIOLOGY & ADDITIONAL TESTS:    < from: CT Chest No Cont (02.12.19 @ 22:00) >  NTERPRETATION:    CLINICAL HISTORY:   Myasthenia gravis , rule out thymoma    TECHNIQUE:    Axial computed tomography images of the chest without intravenous   contrast.    Coronal and sagittal reformatted images were created and reviewed.    MIP reconstructed images were created and reviewed.     COMPARISON:    CR XR CHEST PA AND LATERAL 2/11/2019 2:43 AM     FINDINGS:    Thyroid:  Bilateral thyroid nodules , the largest one measures 1.1 cm   within the right lobe.   Lungs: Patent central airways without bronchial wall thickening or   bronchiectasis. No consolidation or pulmonary edema. No suspicious lung   nodule.   Pleural space: Normal. No pneumothorax. No pleural effusion.    Heart: Normal. No cardiomegaly. No pericardial effusion.    Aorta: Normal. No aortic aneurysm.   Mediastinum: No mediastinal mass or thymoma.   Lymph nodes: No lymphadenopathy. Few subcentimeter left supraclavicular   lymph nodes.   Bones/joints: Unremarkable. No acute fracture.    Soft tissues: Unremarkable.    Liver:  Too small to characterize hypodensities within the liver likely   benign.    IMPRESSION:     1.  No mediastinal mass or thymoma.    2.  Clear lungs.     3.  Bilateral thyroid nodules, the largest one measures 1.1 cm within the   right lobe. Recommend dedicated exam. Ultrasound of the thyroid.                OLGA ALEJO M.D., ATTENDING RADIOLOGIST  This document has been electronically signed. Feb 13 20198:33AM                    Imaging Personally Reviewed:  YES  Consultant(s) Notes Reviewed:   YES    Care Discussed with Consultants : YES    Plan of care was discussed with patient and /or primary care giver; all questions and concerns were addressed and care was aligned with patient's wishes. YES PGY 1 Note discussed with supervising resident and primary attending    Patient is a 54y old  Female who presents with a chief complaint of weakness (12 Feb 2019 12:06)      INTERVAL HPI/OVERNIGHT EVENTS: Patient seen and examined at bedside, symptoms progressively improving from admission. Patient found walking on the hallway w/o difficulties, in good spirits and inquiring when she can go home. Denies respiratory symptoms, has good strength and eating without difficulties.     MEDICATIONS  (STANDING):  azaTHIOprine 50 milliGRAM(s) Oral daily  enoxaparin Injectable 40 milliGRAM(s) SubCutaneous every 24 hours  pantoprazole    Tablet 40 milliGRAM(s) Oral before breakfast  potassium chloride   Powder 40 milliEquivalent(s) Oral once  predniSONE   Tablet 60 milliGRAM(s) Oral daily  pyridostigmine 60 milliGRAM(s) Oral three times a day    MEDICATIONS  (PRN):      __________________________________________________  REVIEW OF SYSTEMS:    CONSTITUTIONAL: No fever,   EYES: no acute visual disturbances  NECK: No pain or stiffness  RESPIRATORY: No cough; No shortness of breath  CARDIOVASCULAR: No chest pain, no palpitations  GASTROINTESTINAL: No pain. No nausea or vomiting; No diarrhea   NEUROLOGICAL: No headache or numbness, no tremors  MUSCULOSKELETAL: No joint pain, no muscle pain  GENITOURINARY: no dysuria, no frequency, no hesitancy  PSYCHIATRY: no depression , no anxiety  ALL OTHER  ROS negative        Vital Signs Last 24 Hrs  T(C): 36.9 (13 Feb 2019 07:45), Max: 37.3 (13 Feb 2019 00:22)  T(F): 98.4 (13 Feb 2019 07:45), Max: 99.1 (13 Feb 2019 00:22)  HR: 57 (13 Feb 2019 07:45) (57 - 68)  BP: 100/- (13 Feb 2019 07:45) (99/49 - 100/56)  BP(mean): --  RR: 16 (13 Feb 2019 07:45) (16 - 16)  SpO2: 98% (13 Feb 2019 07:45) (98% - 100%)    ________________________________________________  PHYSICAL EXAM:  GENERAL: NAD  HEENT: Normocephalic;  conjunctivae and sclerae clear; moist mucous membranes;   NECK : supple  CHEST/LUNG: CTA B/L  HEART: S1 S2nL, no M/G/R, RRR  ABDOMEN: Soft, Nontender, Nondistended; BS+  EXTREMITIES: no ECC  SKIN: warm and dry; no rash  NERVOUS SYSTEM:  AAOx3, left eye strabismus, power 5/5 UE B/L, 4/5 LE B/L. SILT B/L    _________________________________________________  LABS:                        12.1   7.07  )-----------( 221      ( 13 Feb 2019 06:41 )             36.8     02-13    141  |  108  |  17  ----------------------------<  101<H>  3.6   |  27  |  0.69    Ca    8.0<L>      13 Feb 2019 06:41  Phos  4.0     02-13  Mg     2.1     02-13          CAPILLARY BLOOD GLUCOSE            RADIOLOGY & ADDITIONAL TESTS:    < from: CT Chest No Cont (02.12.19 @ 22:00) >  NTERPRETATION:    CLINICAL HISTORY:   Myasthenia gravis , rule out thymoma    TECHNIQUE:    Axial computed tomography images of the chest without intravenous   contrast.    Coronal and sagittal reformatted images were created and reviewed.    MIP reconstructed images were created and reviewed.     COMPARISON:    CR XR CHEST PA AND LATERAL 2/11/2019 2:43 AM     FINDINGS:    Thyroid:  Bilateral thyroid nodules , the largest one measures 1.1 cm   within the right lobe.   Lungs: Patent central airways without bronchial wall thickening or   bronchiectasis. No consolidation or pulmonary edema. No suspicious lung   nodule.   Pleural space: Normal. No pneumothorax. No pleural effusion.    Heart: Normal. No cardiomegaly. No pericardial effusion.    Aorta: Normal. No aortic aneurysm.   Mediastinum: No mediastinal mass or thymoma.   Lymph nodes: No lymphadenopathy. Few subcentimeter left supraclavicular   lymph nodes.   Bones/joints: Unremarkable. No acute fracture.    Soft tissues: Unremarkable.    Liver:  Too small to characterize hypodensities within the liver likely   benign.    IMPRESSION:     1.  No mediastinal mass or thymoma.    2.  Clear lungs.     3.  Bilateral thyroid nodules, the largest one measures 1.1 cm within the   right lobe. Recommend dedicated exam. Ultrasound of the thyroid.                OLGA ALEJO M.D., ATTENDING RADIOLOGIST  This document has been electronically signed. Feb 13 20198:33AM                    Imaging Personally Reviewed:  YES  Consultant(s) Notes Reviewed:   YES    Care Discussed with Consultants : YES    Plan of care was discussed with patient and /or primary care giver; all questions and concerns were addressed and care was aligned with patient's wishes. YES

## 2019-02-13 NOTE — PROGRESS NOTE ADULT - PROBLEM SELECTOR PLAN 3
IMPROVE VTE Individual Risk Assessment    RISK                                                          Points  [] Previous VTE                                           3  [] Thrombophilia                                        2  [] Lower limb paralysis                              2   [] Current Cancer                                       2   [x] Immobilization > 24 hrs                        1  [] ICU/CCU stay > 24 hours                       1  [] Age > 60                                                   1    IMPROVE VTE Score: 1, no indication for DVT PPx
IMPROVE VTE Individual Risk Assessment    RISK                                                          Points  [] Previous VTE                                           3  [] Thrombophilia                                        2  [] Lower limb paralysis                              2   [] Current Cancer                                       2   [x] Immobilization > 24 hrs                        1  [] ICU/CCU stay > 24 hours                       1  [] Age > 60                                                   1    IMPROVE VTE Score: 1, no indication for DVT PPx

## 2019-02-13 NOTE — PROGRESS NOTE ADULT - PROBLEM SELECTOR PLAN 1
MG flare likely precipitated by urinary tract infection.   Symptoms currently improving, no signs of respiratory distress, w/ appropriate VC/NIF. Patient's symptoms resolving, ambulating in hallway w/o difficulties.  c/w Pyridostigmine 60 TID + Prednisone 60 mg QD + Azathioprine 50 mg QD  c/w IVIG treatment completed  Monitor NIF/VC q6hrs  CT chest: no mediastinal mass or thymoma; bilateral nodules in thyroid, largest measuring 1.1 cm in R lobe.   Dr Reich recommendations appreciated  PT evaluation: OP PT

## 2019-02-13 NOTE — PROGRESS NOTE ADULT - PROBLEM SELECTOR PLAN 2
Patient currently asymptomatic, afebrile, no leukocytosis.   UCx NGTD  Will c/w Rocephin for a total of 3 days: last dose today Patient currently asymptomatic, afebrile, no leukocytosis.   UCx NGTD  Patient completed antibiotic treatment today. (D3)

## 2019-02-13 NOTE — DISCHARGE NOTE ADULT - HOSPITAL COURSE
54F w/ PMHx Myasthenia Gravis (diagnosed in 2008, initially presenting w/ L eye ptosis, never intubated or admitted to ICU)  and no PSH p/w multiple falls x 1 day - last fall near 11 PM, last took Pyridostigmine at 6 PM. At the time of fall patient was unable to stand up - called her family who came and gave her 120 mg which improved her weakness. As per patient she has been taking the medications x 10 years - on and off - and recently restarted in January 2018 for worsening weakness. Of note patient began taking Prednisone ? mg since Friday. Otherwise patient denied any fevers, dysphagia, chills, CP, SOB, cough, abdominal pain, NVDC, urinary changes, or any other complaints. Attempted to reach the pharmacy to clarify medication dosing including steroid taper - did not . In the ED patient seen in NAD, no respiratory complaints, and labs noted for WBC elevation 11.6 and +UA albeit contaminated w/ squamous cells.      Patient admitted to medicine for MG flare likely precipitated by urinary tract infection. Patient evaluated  by neurology who recommended IVIG x 3 doses, in addition to Pyridostigmine 60 TID + Prednisone 60 mg QD + Azathioprine 50 mg QD. Patient's NIF/VC was monitored q6hrs, with good values throughout admission. Patient underwent CT chest which showed no mediastinal mass or thymoma; bilateral nodules in thyroid, largest measuring 1.1 cm in R lobe. Also evaluated by physical therapy who recommended OP PT. Patient improved significantly after treatment, currently ambulating hallway of the unit without difficulties, no respiratory issues and with resolution of muscle weakness. Patient will follow up with Dr Reich clinic upon discharge for close follow up and slow steroid taper as clinically indicated. Patient completed 3 days of IV Rocephin for UTI, although UCx were negative and patient had no obvious urinary symptoms. Given patient's improved clinical status and current hemodynamic stability, decision was made to discharge.Please refer to patient's complete medical chart with documents for a full hospital course, for this is only a brief summary. 54F w/ PMHx Myasthenia Gravis (diagnosed in 2008, initially presenting w/ L eye ptosis, never intubated or admitted to ICU)  and no PSH p/w multiple falls x 1 day - last fall near 11 PM, last took Pyridostigmine at 6 PM. At the time of fall patient was unable to stand up - called her family who came and gave her 120 mg which improved her weakness. As per patient she has been taking the medications x 10 years - on and off - and recently restarted in January 2018 for worsening weakness. Of note patient began taking Prednisone ? mg since Friday. Otherwise patient denied any fevers, dysphagia, chills, CP, SOB, cough, abdominal pain, NVDC, urinary changes, or any other complaints. Attempted to reach the pharmacy to clarify medication dosing including steroid taper - did not . In the ED patient seen in NAD, no respiratory complaints, and labs noted for WBC elevation 11.6 and +UA albeit contaminated w/ squamous cells.      Patient admitted to medicine for MG flare likely precipitated by urinary tract infection. Patient evaluated  by neurologist: Dr Reich, who recommended IVIG x 3 doses, in addition to Pyridostigmine 60 TID + Prednisone 60 mg QD + Azathioprine 50 mg QD. Patient's NIF/VC was monitored q6hrs, with good values throughout admission. Patient underwent CT chest which showed no mediastinal mass or thymoma; bilateral nodules in thyroid, largest measuring 1.1 cm in R lobe. Patient was instructed via Mandarin  ([phone]) to follow up with PCP for further evaluation, preferably with Ultrasound of the thyroid. Patient will need TSH level once off steroids for accurate measurement.        Patient was also evaluated by physical therapy who recommended OP PT. Patient improved significantly after treatment, currently ambulating hallway of the unit without difficulties, no respiratory issues and with resolution of muscle weakness. Patient will follow up with OP neurologist clinic on Friday for close follow up and slow steroid taper as clinically indicated. Patient completed 3 days of IV Rocephin for UTI, although UCx were negative and patient had no obvious urinary symptoms. Given patient's improved clinical status and current hemodynamic stability, decision was made to discharge.Please refer to patient's complete medical chart with documents for a full hospital course, for this is only a brief summary.

## 2019-02-19 LAB — ACRM MODULATING ANTIBODY: 4.66 NMOL/L — HIGH

## 2019-10-16 PROBLEM — G70.00 MYASTHENIA GRAVIS WITHOUT (ACUTE) EXACERBATION: Chronic | Status: ACTIVE | Noted: 2019-02-11

## 2019-10-24 ENCOUNTER — APPOINTMENT (OUTPATIENT)
Dept: THORACIC SURGERY | Facility: CLINIC | Age: 54
End: 2019-10-24

## 2019-11-07 ENCOUNTER — APPOINTMENT (OUTPATIENT)
Dept: THORACIC SURGERY | Facility: CLINIC | Age: 54
End: 2019-11-07
Payer: MEDICAID

## 2019-11-07 VITALS
HEIGHT: 62 IN | RESPIRATION RATE: 18 BRPM | OXYGEN SATURATION: 97 % | TEMPERATURE: 98.4 F | BODY MASS INDEX: 22.08 KG/M2 | SYSTOLIC BLOOD PRESSURE: 109 MMHG | DIASTOLIC BLOOD PRESSURE: 71 MMHG | HEART RATE: 63 BPM | WEIGHT: 120 LBS

## 2019-11-07 DIAGNOSIS — Z82.49 FAMILY HISTORY OF ISCHEMIC HEART DISEASE AND OTHER DISEASES OF THE CIRCULATORY SYSTEM: ICD-10-CM

## 2019-11-07 DIAGNOSIS — Z86.39 PERSONAL HISTORY OF OTHER ENDOCRINE, NUTRITIONAL AND METABOLIC DISEASE: ICD-10-CM

## 2019-11-07 PROCEDURE — 99205 OFFICE O/P NEW HI 60 MIN: CPT

## 2019-11-08 PROBLEM — Z86.39 HISTORY OF HYPERGLYCEMIA: Status: RESOLVED | Noted: 2019-11-08 | Resolved: 2019-11-08

## 2019-11-08 PROBLEM — Z82.49 FAMILY HISTORY OF CARDIAC DISORDER: Status: ACTIVE | Noted: 2019-11-08

## 2019-11-08 RX ORDER — PREDNISONE 10 MG/1
10 TABLET ORAL DAILY
Refills: 0 | Status: ACTIVE | COMMUNITY

## 2019-11-08 RX ORDER — AZATHIOPRINE 50 1/1
50 TABLET ORAL
Refills: 0 | Status: ACTIVE | COMMUNITY

## 2019-11-08 RX ORDER — PYRIDOSTIGMINE BROMIDE 60 MG/1
60 TABLET ORAL 4 TIMES DAILY
Refills: 0 | Status: ACTIVE | COMMUNITY

## 2019-11-11 NOTE — HISTORY OF PRESENT ILLNESS
[FreeTextEntry1] : Ms. RASHAWN PAREDES, 54 year old female, never smoker, w/ hx of Myasthenia Gravis on Mestinol on/off x 10 years, hyperglycemia 2/2 steroid use (not on meds), who presented had a flare-up 2/2019 2/2 UTI, s/p IVIG and prednisone. \par \par CT Chest on 11/4/19 showed no mediastinal mass.\par \par Patient is here today for CT Sx consultation, referred by PCP Dr. Darian Rosales. Denies SOB, CP, cough.\par

## 2019-11-11 NOTE — CONSULT LETTER
[Dear  ___] : Dear  [unfilled], [Consult Letter:] : I had the pleasure of evaluating your patient, [unfilled]. [( Thank you for referring [unfilled] for consultation for _____ )] : Thank you for referring [unfilled] for consultation for [unfilled] [Please see my note below.] : Please see my note below. [Consult Closing:] : Thank you very much for allowing me to participate in the care of this patient.  If you have any questions, please do not hesitate to contact me. [Sincerely,] : Sincerely, [DrMadeline  ___] : Dr. SARAVIA [FreeTextEntry2] : Darian Rosales MD (PCP/Referring)\par Dr. Calvert (Neurologist) [FreeTextEntry3] : Justin Parr MD, MPH \par System Director of Thoracic Surgery \par Director of Comprehensive Lung [facility] [place] \par Professor Cardiovascular & Thoracic Surgery  \par [facility] [employer] at [facility]\par

## 2019-11-11 NOTE — PHYSICAL EXAM
[General Appearance - Alert] : alert [General Appearance - In No Acute Distress] : in no acute distress [Sclera] : the sclera and conjunctiva were normal [PERRL With Normal Accommodation] : pupils were equal in size, round, and reactive to light [Extraocular Movements] : extraocular movements were intact [Outer Ear] : the ears and nose were normal in appearance [Oropharynx] : the oropharynx was normal [Neck Appearance] : the appearance of the neck was normal [Neck Cervical Mass (___cm)] : no neck mass was observed [Jugular Venous Distention Increased] : there was no jugular-venous distention [Thyroid Nodule] : there were no palpable thyroid nodules [Thyroid Diffuse Enlargement] : the thyroid was not enlarged [Auscultation Breath Sounds / Voice Sounds] : lungs were clear to auscultation bilaterally [Heart Sounds] : normal S1 and S2 [Heart Rate And Rhythm] : heart rate was normal and rhythm regular [Heart Sounds Gallop] : no gallops [Murmurs] : no murmurs [Heart Sounds Pericardial Friction Rub] : no pericardial rub [Examination Of The Chest] : the chest was normal in appearance [Chest Visual Inspection Thoracic Asymmetry] : no chest asymmetry [Diminished Respiratory Excursion] : normal chest expansion [2+] : left 2+ [Breast Appearance] : normal in appearance [Breast Palpation Mass] : no palpable masses [Bowel Sounds] : normal bowel sounds [Abdomen Soft] : soft [Abdomen Tenderness] : non-tender [Abdomen Mass (___ Cm)] : no abdominal mass palpated [Cervical Lymph Nodes Enlarged Posterior Bilaterally] : posterior cervical [Cervical Lymph Nodes Enlarged Anterior Bilaterally] : anterior cervical [No CVA Tenderness] : no ~M costovertebral angle tenderness [Supraclavicular Lymph Nodes Enlarged Bilaterally] : supraclavicular [No Spinal Tenderness] : no spinal tenderness [Abnormal Walk] : normal gait [Musculoskeletal - Swelling] : no joint swelling seen [Nail Clubbing] : no clubbing  or cyanosis of the fingernails [Skin Color & Pigmentation] : normal skin color and pigmentation [Motor Tone] : muscle strength and tone were normal [] : no rash [Skin Turgor] : normal skin turgor [Deep Tendon Reflexes (DTR)] : deep tendon reflexes were 2+ and symmetric [Sensation] : the sensory exam was normal to light touch and pinprick [No Focal Deficits] : no focal deficits [Affect] : the affect was normal [Oriented To Time, Place, And Person] : oriented to person, place, and time [Impaired Insight] : insight and judgment were intact [FreeTextEntry1] : deferred

## 2019-11-11 NOTE — ASSESSMENT
[FreeTextEntry1] : Ms. RASHAWN PAREDES, 54 year old female, never smoker, w/ hx of Myasthenia Gravis on Mestinol on/off x 10 years, hyperglycemia 2/2 steroid use (not on meds), who presented had a flare-up 2/2019 2/2 UTI, s/p IVIG and prednisone. \par \par CT Chest on 11/4/19 showed no mediastinal mass.\par \par I have reviewed the patient's medical records and diagnostic images at time of this office consultation and have made the following recommendation:\par 1. Case d/w PCP Dr. Rosales, Patient is symptomatic from M.G. without a mediastinal mass, I recommended bilateral VATS, Robotic-assisted, Radical Thymectomy on 11/20/19. Risks and benefits and alternatives explained to patient, all questions answered, patient agreed to proceed with surgery.\par 2. PFTs\par 3. Medical and Neurologist clearances; PST\par \par \par Written by Candy Ibarra NP, acting as a scribe for Dr. Justin Parr.\par \par The documentation recorded by the scribe accurately reflects the service I personally performed and the decisions made by me. JUSTIN PARR MD\par

## 2019-11-13 ENCOUNTER — OUTPATIENT (OUTPATIENT)
Dept: OUTPATIENT SERVICES | Facility: HOSPITAL | Age: 54
LOS: 1 days | End: 2019-11-13

## 2019-11-13 VITALS
HEIGHT: 62 IN | HEART RATE: 65 BPM | OXYGEN SATURATION: 99 % | TEMPERATURE: 98 F | RESPIRATION RATE: 16 BRPM | SYSTOLIC BLOOD PRESSURE: 114 MMHG | DIASTOLIC BLOOD PRESSURE: 76 MMHG | WEIGHT: 123.9 LBS

## 2019-11-13 DIAGNOSIS — G70.00 MYASTHENIA GRAVIS WITHOUT (ACUTE) EXACERBATION: ICD-10-CM

## 2019-11-13 LAB — HCG SERPL-ACNC: < 5 MIU/ML — SIGNIFICANT CHANGE UP

## 2019-11-13 RX ORDER — SODIUM CHLORIDE 9 MG/ML
1000 INJECTION, SOLUTION INTRAVENOUS
Refills: 0 | Status: DISCONTINUED | OUTPATIENT
Start: 2019-11-20 | End: 2019-11-21

## 2019-11-13 NOTE — H&P PST ADULT - RS GEN PE MLT RESP DETAILS PC
breath sounds equal/respirations non-labored/good air movement airway patent/breath sounds equal/good air movement/clear to auscultation bilaterally/respirations non-labored

## 2019-11-13 NOTE — H&P PST ADULT - ATTENDING COMMENTS
Discussed with Dr Austin - medical resident.  This is a 54 year old woman with PMH of Myasthenia Gravis followed in the o/p clinic by neurologist - Sam Bellamy ( [phone]) who presents with progressive and frequent falls over the last few weeks. She states that in the past 24 hours however, she had multiple falls with the last episode last evening after which she was unable to get up by herself. She denies any preceding symptoms and no LOC. She did not hit her head and denies any focal pain in the body or extremities.   Of note, her neurologist has been increasing / adjusting her dose of pyridostigmine and she used double the dose as instructed and she got better. She decided to come to the ED afterwards.  ROS was noncontributory except for subjective fever after her fall today.    Vital Signs Last 24 Hrs  T(C): 37.1 (10 Feb 2019 23:56), Max: 37.1 (10 Feb 2019 23:56)  T(F): 98.8 (10 Feb 2019 23:56), Max: 98.8 (10 Feb 2019 23:56)  HR: 70 (10 Feb 2019 23:56) (70 - 70)  BP: 126/74 (10 Feb 2019 23:56) (126/74 - 126/74)  RR: 16 (10 Feb 2019 23:56) (16 - 16)  SpO2: 100% (10 Feb 2019 23:56) (100% - 100%)    middle aged woman, NAD, AAO X 3  MMM, good degluttition, no LAD  CTA B/L; RRR S1S2   Soft, flat, NT ND BS +  No pedal edema  Power 4/5 in both forearms and 3/5 in both arms  Power 4/5 in both legs and 3/5 in the thighs  No CN deficits noted  No sensory deficits  Labs                        14.1   11.6  )-----------( 242      ( 2019 01:39 )             43.3   02-11    142  |  110<H>  |  14  ----------------------------<  121<H>  3.9   |  26  |  0.71    Ca    8.6      2019 01:39    TPro  7.5  /  Alb  3.7  /  TBili  0.2  /  DBili  x   /  AST  27  /  ALT  32  /  Alk Phos  70  02-11    Urinalysis Basic - ( 2019 00:43 )    Color: Yellow / Appearance: Clear / S.010 / pH: x  Gluc: x / Ketone: Negative  / Bili: Negative / Urobili: Negative   Blood: x / Protein: Negative / Nitrite: Negative   Leuk Esterase: Moderate / RBC: 5-10 /HPF / WBC 11-25 /HPF   Sq Epi: x / Non Sq Epi: Moderate /HPF / Bacteria: Moderate /HPF    trop - 0.024    Impression  Myasthenia gravis crises- worsening likely from underlying infection - UTI  Admit to medical lopez  Obtain ucx and start empiric antibiotics - Rocephin  Continue pyridostigmine 60 mg PO TID  Hold steroid and await neurologist evaluation  Tentative recommendation for IVIG; would await his evaluation this AM

## 2019-11-13 NOTE — H&P PST ADULT - NEUROLOGICAL DETAILS
alert and oriented x 3/responds to pain/strength decreased/sensation intact/responds to verbal commands

## 2019-11-13 NOTE — H&P PST ADULT - NSICDXPROBLEM_GEN_ALL_CORE_FT
PROBLEM DIAGNOSES  Problem: Myasthenia gravis  Assessment and Plan: Pt scheduled for surgery and preop instructions including instructions for taking Famotidine and for Chlorhexidine use in showering on the day of surgery, given verbally and with use of  written materials, and patient confirming understanding of such instructions using  teach back   method.  MC and EKG in chart - BW in chart   UCG ordered am DOS

## 2019-11-13 NOTE — H&P PST ADULT - NSANTHOSAYNRD_GEN_A_CORE
pt denies testing/No. HELGA screening performed.  STOP BANG Legend: 0-2 = LOW Risk; 3-4 = INTERMEDIATE Risk; 5-8 = HIGH Risk

## 2019-11-13 NOTE — H&P PST ADULT - HISTORY OF PRESENT ILLNESS
55 y/o Mandarin F w/ PMH Myasthenia Gravis and no PSH p/w multiple falls x 1 day - last fall near 11 PM, last took Pyridostigmine at 6 PM. At the time of fall patient was unable to stand up - called her family who came and gave her 120 mg which improved her weakness. As per patient she has been taking the medications x 10 years - on and off - and recently restarted in January 2018 for worsening weakness. Of note patient began taking Prednisone ? mg since Friday. Otherwise patient denied any fevers, dysphagia, chills, CP, SOB, cough, abdominal pain, NVDC, urinary changes, or any other complaints. Attempted to reach the pharmacy to clarify medication dosing including steroid taper - did not . In the ED patient seen in NAD, no respiratory complaints, and labs noted for WBC elevation 11.6 and +UA albeit contaminated w/ squamous cells. Pt is a 54 yr old female Mandarin speaking scheduled for Bilateral Video Assisted Thoracoscopy Robotic Assisted Radical Thymectomy with Dr Parr 11/20/19. Pt seen in PST and translation used but pt is poor historian. Pt describes increased weakness over past 10yrs and now to have surgery to improve symptoms. Pt hx of multiple falls.

## 2019-11-14 LAB
BLD GP AB SCN SERPL QL: NEGATIVE — SIGNIFICANT CHANGE UP
RH IG SCN BLD-IMP: POSITIVE — SIGNIFICANT CHANGE UP

## 2019-11-15 ENCOUNTER — APPOINTMENT (OUTPATIENT)
Dept: PULMONOLOGY | Facility: CLINIC | Age: 54
End: 2019-11-15
Payer: MEDICAID

## 2019-11-15 PROCEDURE — 94729 DIFFUSING CAPACITY: CPT

## 2019-11-15 PROCEDURE — 94010 BREATHING CAPACITY TEST: CPT

## 2019-11-15 PROCEDURE — 94726 PLETHYSMOGRAPHY LUNG VOLUMES: CPT

## 2019-11-19 ENCOUNTER — TRANSCRIPTION ENCOUNTER (OUTPATIENT)
Age: 54
End: 2019-11-19

## 2019-11-20 ENCOUNTER — RESULT REVIEW (OUTPATIENT)
Age: 54
End: 2019-11-20

## 2019-11-20 ENCOUNTER — INPATIENT (INPATIENT)
Facility: HOSPITAL | Age: 54
LOS: 1 days | Discharge: HOME | End: 2019-11-22
Attending: THORACIC SURGERY (CARDIOTHORACIC VASCULAR SURGERY) | Admitting: THORACIC SURGERY (CARDIOTHORACIC VASCULAR SURGERY)
Payer: MEDICAID

## 2019-11-20 ENCOUNTER — APPOINTMENT (OUTPATIENT)
Dept: THORACIC SURGERY | Facility: HOSPITAL | Age: 54
End: 2019-11-20

## 2019-11-20 VITALS
HEIGHT: 62 IN | SYSTOLIC BLOOD PRESSURE: 122 MMHG | WEIGHT: 123.9 LBS | HEART RATE: 64 BPM | OXYGEN SATURATION: 100 % | DIASTOLIC BLOOD PRESSURE: 58 MMHG | TEMPERATURE: 98 F | RESPIRATION RATE: 15 BRPM

## 2019-11-20 DIAGNOSIS — G70.00 MYASTHENIA GRAVIS WITHOUT (ACUTE) EXACERBATION: ICD-10-CM

## 2019-11-20 LAB
HCG UR QL: NEGATIVE — SIGNIFICANT CHANGE UP
RH IG SCN BLD-IMP: POSITIVE — SIGNIFICANT CHANGE UP

## 2019-11-20 PROCEDURE — S2900 ROBOTIC SURGICAL SYSTEM: CPT | Mod: NC

## 2019-11-20 PROCEDURE — 99233 SBSQ HOSP IP/OBS HIGH 50: CPT

## 2019-11-20 PROCEDURE — 88307 TISSUE EXAM BY PATHOLOGIST: CPT | Mod: 26

## 2019-11-20 PROCEDURE — 32673 THORACOSCOPY W/THYMUS RESECT: CPT | Mod: AS,22

## 2019-11-20 PROCEDURE — 32673 THORACOSCOPY W/THYMUS RESECT: CPT | Mod: 22

## 2019-11-20 PROCEDURE — 71045 X-RAY EXAM CHEST 1 VIEW: CPT | Mod: 26

## 2019-11-20 RX ORDER — PYRIDOSTIGMINE BROMIDE 60 MG/5ML
60 SOLUTION ORAL THREE TIMES A DAY
Refills: 0 | Status: DISCONTINUED | OUTPATIENT
Start: 2019-11-20 | End: 2019-11-22

## 2019-11-20 RX ORDER — PANTOPRAZOLE SODIUM 20 MG/1
40 TABLET, DELAYED RELEASE ORAL
Refills: 0 | Status: DISCONTINUED | OUTPATIENT
Start: 2019-11-20 | End: 2019-11-22

## 2019-11-20 RX ORDER — POLYETHYLENE GLYCOL 3350 17 G/17G
17 POWDER, FOR SOLUTION ORAL DAILY
Refills: 0 | Status: DISCONTINUED | OUTPATIENT
Start: 2019-11-21 | End: 2019-11-22

## 2019-11-20 RX ORDER — ACETAMINOPHEN 500 MG
1000 TABLET ORAL ONCE
Refills: 0 | Status: COMPLETED | OUTPATIENT
Start: 2019-11-20 | End: 2019-11-20

## 2019-11-20 RX ORDER — AZATHIOPRINE 100 MG/1
50 TABLET ORAL DAILY
Refills: 0 | Status: DISCONTINUED | OUTPATIENT
Start: 2019-11-20 | End: 2019-11-21

## 2019-11-20 RX ORDER — HEPARIN SODIUM 5000 [USP'U]/ML
5000 INJECTION INTRAVENOUS; SUBCUTANEOUS EVERY 12 HOURS
Refills: 0 | Status: DISCONTINUED | OUTPATIENT
Start: 2019-11-20 | End: 2019-11-22

## 2019-11-20 RX ORDER — HEPARIN SODIUM 5000 [USP'U]/ML
5000 INJECTION INTRAVENOUS; SUBCUTANEOUS ONCE
Refills: 0 | Status: COMPLETED | OUTPATIENT
Start: 2019-11-20 | End: 2019-11-20

## 2019-11-20 RX ORDER — ALBUMIN HUMAN 25 %
250 VIAL (ML) INTRAVENOUS ONCE
Refills: 0 | Status: COMPLETED | OUTPATIENT
Start: 2019-11-20 | End: 2019-11-20

## 2019-11-20 RX ORDER — HYDROMORPHONE HYDROCHLORIDE 2 MG/ML
30 INJECTION INTRAMUSCULAR; INTRAVENOUS; SUBCUTANEOUS
Refills: 0 | Status: DISCONTINUED | OUTPATIENT
Start: 2019-11-20 | End: 2019-11-21

## 2019-11-20 RX ORDER — PYRIDOSTIGMINE BROMIDE 60 MG/5ML
1 SOLUTION ORAL
Qty: 0 | Refills: 0 | DISCHARGE

## 2019-11-20 RX ORDER — AZATHIOPRINE 100 MG/1
1 TABLET ORAL
Qty: 0 | Refills: 0 | DISCHARGE

## 2019-11-20 RX ORDER — SENNA PLUS 8.6 MG/1
2 TABLET ORAL AT BEDTIME
Refills: 0 | Status: DISCONTINUED | OUTPATIENT
Start: 2019-11-20 | End: 2019-11-22

## 2019-11-20 RX ORDER — KETOROLAC TROMETHAMINE 30 MG/ML
15 SYRINGE (ML) INJECTION ONCE
Refills: 0 | Status: DISCONTINUED | OUTPATIENT
Start: 2019-11-20 | End: 2019-11-20

## 2019-11-20 RX ORDER — KETOROLAC TROMETHAMINE 30 MG/ML
15 SYRINGE (ML) INJECTION ONCE
Refills: 0 | Status: DISCONTINUED | OUTPATIENT
Start: 2019-11-20 | End: 2019-11-21

## 2019-11-20 RX ORDER — HYDROMORPHONE HYDROCHLORIDE 2 MG/ML
0.5 INJECTION INTRAMUSCULAR; INTRAVENOUS; SUBCUTANEOUS
Refills: 0 | Status: DISCONTINUED | OUTPATIENT
Start: 2019-11-20 | End: 2019-11-21

## 2019-11-20 RX ADMIN — SODIUM CHLORIDE 30 MILLILITER(S): 9 INJECTION, SOLUTION INTRAVENOUS at 10:10

## 2019-11-20 RX ADMIN — Medication 400 MILLIGRAM(S): at 19:45

## 2019-11-20 RX ADMIN — SENNA PLUS 2 TABLET(S): 8.6 TABLET ORAL at 21:38

## 2019-11-20 RX ADMIN — HEPARIN SODIUM 5000 UNIT(S): 5000 INJECTION INTRAVENOUS; SUBCUTANEOUS at 18:41

## 2019-11-20 RX ADMIN — Medication 15 MILLIGRAM(S): at 15:25

## 2019-11-20 RX ADMIN — Medication 1000 MILLIGRAM(S): at 20:15

## 2019-11-20 RX ADMIN — Medication 125 MILLILITER(S): at 20:11

## 2019-11-20 RX ADMIN — HYDROMORPHONE HYDROCHLORIDE 30 MILLILITER(S): 2 INJECTION INTRAMUSCULAR; INTRAVENOUS; SUBCUTANEOUS at 14:30

## 2019-11-20 RX ADMIN — HYDROMORPHONE HYDROCHLORIDE 30 MILLILITER(S): 2 INJECTION INTRAMUSCULAR; INTRAVENOUS; SUBCUTANEOUS at 19:28

## 2019-11-20 RX ADMIN — HYDROMORPHONE HYDROCHLORIDE 0.5 MILLIGRAM(S): 2 INJECTION INTRAMUSCULAR; INTRAVENOUS; SUBCUTANEOUS at 14:36

## 2019-11-20 RX ADMIN — Medication 125 MILLILITER(S): at 15:30

## 2019-11-20 RX ADMIN — HEPARIN SODIUM 5000 UNIT(S): 5000 INJECTION INTRAVENOUS; SUBCUTANEOUS at 10:10

## 2019-11-20 RX ADMIN — PYRIDOSTIGMINE BROMIDE 60 MILLIGRAM(S): 60 SOLUTION ORAL at 21:38

## 2019-11-20 RX ADMIN — Medication 15 MILLIGRAM(S): at 15:10

## 2019-11-20 NOTE — BRIEF OPERATIVE NOTE - NSICDXBRIEFPROCEDURE_GEN_ALL_CORE_FT
PROCEDURES:  Thymectomy with radical mediastinal dissection 20-Nov-2019 14:43:15 robotic assisted radical thymectomy Sahra James

## 2019-11-20 NOTE — ASU PATIENT PROFILE, ADULT - PAIN CHRONIC, PROFILE
Problem: Pain  Goal: *Control of Pain  Outcome: Progressing Towards Goal     Problem: Falls - Risk of  Goal: *Absence of Falls  Description  Document Mona Guido Fall Risk and appropriate interventions in the flowsheet.   Outcome: Progressing Towards Goal  Note:   Fall Risk Interventions:  Mobility Interventions: Bed/chair exit alarm, Communicate number of staff needed for ambulation/transfer, Patient to call before getting OOB         Medication Interventions: Patient to call before getting OOB, Teach patient to arise slowly    Elimination Interventions: Bed/chair exit alarm, Call light in reach, Patient to call for help with toileting needs              Problem: Patient Education: Go to Patient Education Activity  Goal: Patient/Family Education  Outcome: Progressing Towards Goal yes

## 2019-11-20 NOTE — PROGRESS NOTE ADULT - SUBJECTIVE AND OBJECTIVE BOX
RASHAWN PAREDES                     MRN-[id number]    HPI:  Pt is a 54 yr old female Mandarin speaking scheduled for Bilateral Video Assisted Thoracoscopy Robotic Assisted Radical Thymectomy with Dr Parr 11/20/19. Pt seen in PST and translation used but pt is poor historian. Pt describes increased weakness over past 10yrs and now to have surgery to improve symptoms. Pt hx of multiple falls. (13 Nov 2019 17:14)      Procedure: robotic assisted radical thymectomy      Issues:  Myasthenia gravis  Thymoma  post op pain      PAST MEDICAL & SURGICAL HISTORY:  Myasthenia gravis  No significant past surgical history            VITAL SIGNS:  Vital Signs Last 24 Hrs  T(C): 36.7 (20 Nov 2019 16:00), Max: 36.9 (20 Nov 2019 09:16)  T(F): 98.1 (20 Nov 2019 16:00), Max: 98.4 (20 Nov 2019 09:16)  HR: 62 (20 Nov 2019 17:00) (59 - 64)  BP: 100/51 (20 Nov 2019 17:00) (91/47 - 122/58)  BP(mean): 63 (20 Nov 2019 17:00) (58 - 76)  RR: 12 (20 Nov 2019 17:00) (12 - 22)  SpO2: 100% (20 Nov 2019 17:00) (99% - 100%)    I/Os:   I&O's Detail    20 Nov 2019 07:01  -  20 Nov 2019 18:01  --------------------------------------------------------  IN:    lactated ringers.: 120 mL  Total IN: 120 mL    OUT:    Chest Tube: 20 mL    Chest Tube: 30 mL  Total OUT: 50 mL    Total NET: 70 mL          CAPILLARY BLOOD GLUCOSE          =======================MEDICATIONS===================  MEDICATIONS  (STANDING):  albumin human  5% IVPB 250 milliLiter(s) IV Intermittent once  azaTHIOprine 50 milliGRAM(s) Oral daily  heparin  Injectable 5000 Unit(s) SubCutaneous every 12 hours  HYDROmorphone PCA (1 mG/mL) 30 milliLiter(s) PCA Continuous PCA Continuous  lactated ringers. 1000 milliLiter(s) (30 mL/Hr) IV Continuous <Continuous>  pantoprazole    Tablet 40 milliGRAM(s) Oral before breakfast  predniSONE   Tablet 10 milliGRAM(s) Oral daily  pyridostigmine 60 milliGRAM(s) Oral three times a day  senna 2 Tablet(s) Oral at bedtime    MEDICATIONS  (PRN):  HYDROmorphone PCA (1 mG/mL) Rescue Clinician Bolus 0.5 milliGRAM(s) IV Push every 15 minutes PRN for Pain Scale GREATER THAN 6        PHYSICAL EXAM============================  General:                         Awake, alert, not in any distress  Neuro:                            Moving all extremities to commands.   Respiratory:	Air entry fair and  bilateral conducted sounds                                           Effort even and unlabored.  CV:		Regular rate and rhythm. Normal S1/S2                                          Distal pulses present.  Abdomen:	                     Soft, non-distended. Bowel sounds present   Skin:		No rash.  Extremities:	Warm, no cyanosis or edema.  Palpable pulses      =============================NEUROLOGY============================  Pain control with PCA /  Tylenol IV / Toradol  MG: Restart Mestinon ans steroids stress dose steroids in OR.   ==============================RESPIRATORY========================  Pt is on 2 L nasal canula   Comfortable, not in any distress.  Using incentive spirometry   MG: Nif and VC monitoring, Continuos pulse ox monitoring   Monitor chest tube output  Chest tube to suction 	  Continue bronchodilators, pulmonary toilet    ============================CARDIOVASCULAR======================  Continue hemodynamic monitoring.  Not on any pressors    =====================RENAL===================  Continue LR 30CC/hr    Monitor I/Os and electrolytes    ====================GASTROINTESTINAL===================  On clears, tolerating  Continue GI prophylaxis with Pepcid / Protonix  Continue Zofran / Reglan for nausea - PRN	    ========================HEMATOLOGIC/ONCOLOGIC====================  Monitor chest tube output. No signs of active bleeding.   Follow CBC in AM    ============================INFECTIOUS DISEASE========================  Monitor for fever / leukocytosis.  All surgical incision / chest tube  sites look clean      Pt is on GI & DVT prophylaxis  OOB & ambulate       Pertinent clinical, laboratory, radiographic, hemodynamic, echocardiographic, respiratory data, microbiologic data and chart were reviewed and analyzed frequently throughout the course of the day and night  Patient seen, examined and plan discussed with CT Surgery / CTICU team during rounds.    Pt's status discussed with family at bedside, updated status        Ramirez Andujar DO FACEP

## 2019-11-21 LAB
ANION GAP SERPL CALC-SCNC: 11 MMO/L — SIGNIFICANT CHANGE UP (ref 7–14)
BASOPHILS # BLD AUTO: 0.01 K/UL — SIGNIFICANT CHANGE UP (ref 0–0.2)
BASOPHILS NFR BLD AUTO: 0.1 % — SIGNIFICANT CHANGE UP (ref 0–2)
BUN SERPL-MCNC: 10 MG/DL — SIGNIFICANT CHANGE UP (ref 7–23)
CALCIUM SERPL-MCNC: 9 MG/DL — SIGNIFICANT CHANGE UP (ref 8.4–10.5)
CHLORIDE SERPL-SCNC: 104 MMOL/L — SIGNIFICANT CHANGE UP (ref 98–107)
CO2 SERPL-SCNC: 26 MMOL/L — SIGNIFICANT CHANGE UP (ref 22–31)
CREAT SERPL-MCNC: 0.58 MG/DL — SIGNIFICANT CHANGE UP (ref 0.5–1.3)
EOSINOPHIL # BLD AUTO: 0 K/UL — SIGNIFICANT CHANGE UP (ref 0–0.5)
EOSINOPHIL NFR BLD AUTO: 0 % — SIGNIFICANT CHANGE UP (ref 0–6)
GLUCOSE SERPL-MCNC: 117 MG/DL — HIGH (ref 70–99)
HCT VFR BLD CALC: 37 % — SIGNIFICANT CHANGE UP (ref 34.5–45)
HGB BLD-MCNC: 12.6 G/DL — SIGNIFICANT CHANGE UP (ref 11.5–15.5)
IMM GRANULOCYTES NFR BLD AUTO: 0.4 % — SIGNIFICANT CHANGE UP (ref 0–1.5)
LYMPHOCYTES # BLD AUTO: 0.86 K/UL — LOW (ref 1–3.3)
LYMPHOCYTES # BLD AUTO: 8.1 % — LOW (ref 13–44)
MCHC RBC-ENTMCNC: 32.6 PG — SIGNIFICANT CHANGE UP (ref 27–34)
MCHC RBC-ENTMCNC: 34.1 % — SIGNIFICANT CHANGE UP (ref 32–36)
MCV RBC AUTO: 95.6 FL — SIGNIFICANT CHANGE UP (ref 80–100)
MONOCYTES # BLD AUTO: 0.75 K/UL — SIGNIFICANT CHANGE UP (ref 0–0.9)
MONOCYTES NFR BLD AUTO: 7 % — SIGNIFICANT CHANGE UP (ref 2–14)
NEUTROPHILS # BLD AUTO: 9.02 K/UL — HIGH (ref 1.8–7.4)
NEUTROPHILS NFR BLD AUTO: 84.4 % — HIGH (ref 43–77)
NRBC # FLD: 0 K/UL — SIGNIFICANT CHANGE UP (ref 0–0)
PLATELET # BLD AUTO: 207 K/UL — SIGNIFICANT CHANGE UP (ref 150–400)
PMV BLD: 9.9 FL — SIGNIFICANT CHANGE UP (ref 7–13)
POTASSIUM SERPL-MCNC: 4.3 MMOL/L — SIGNIFICANT CHANGE UP (ref 3.5–5.3)
POTASSIUM SERPL-SCNC: 4.3 MMOL/L — SIGNIFICANT CHANGE UP (ref 3.5–5.3)
RBC # BLD: 3.87 M/UL — SIGNIFICANT CHANGE UP (ref 3.8–5.2)
RBC # FLD: 12.9 % — SIGNIFICANT CHANGE UP (ref 10.3–14.5)
SODIUM SERPL-SCNC: 141 MMOL/L — SIGNIFICANT CHANGE UP (ref 135–145)
WBC # BLD: 10.68 K/UL — HIGH (ref 3.8–10.5)
WBC # FLD AUTO: 10.68 K/UL — HIGH (ref 3.8–10.5)

## 2019-11-21 PROCEDURE — 71045 X-RAY EXAM CHEST 1 VIEW: CPT | Mod: 26

## 2019-11-21 PROCEDURE — 71045 X-RAY EXAM CHEST 1 VIEW: CPT | Mod: 26,77

## 2019-11-21 PROCEDURE — 99233 SBSQ HOSP IP/OBS HIGH 50: CPT

## 2019-11-21 RX ORDER — OXYCODONE HYDROCHLORIDE 5 MG/1
5 TABLET ORAL EVERY 4 HOURS
Refills: 0 | Status: DISCONTINUED | OUTPATIENT
Start: 2019-11-21 | End: 2019-11-22

## 2019-11-21 RX ORDER — ALBUMIN HUMAN 25 %
250 VIAL (ML) INTRAVENOUS ONCE
Refills: 0 | Status: DISCONTINUED | OUTPATIENT
Start: 2019-11-21 | End: 2019-11-22

## 2019-11-21 RX ORDER — GABAPENTIN 400 MG/1
200 CAPSULE ORAL EVERY 8 HOURS
Refills: 0 | Status: DISCONTINUED | OUTPATIENT
Start: 2019-11-21 | End: 2019-11-22

## 2019-11-21 RX ORDER — AZATHIOPRINE 100 MG/1
50 TABLET ORAL DAILY
Refills: 0 | Status: DISCONTINUED | OUTPATIENT
Start: 2019-11-21 | End: 2019-11-22

## 2019-11-21 RX ORDER — ACETAMINOPHEN 500 MG
975 TABLET ORAL EVERY 6 HOURS
Refills: 0 | Status: DISCONTINUED | OUTPATIENT
Start: 2019-11-21 | End: 2019-11-22

## 2019-11-21 RX ORDER — OXYCODONE HYDROCHLORIDE 5 MG/1
10 TABLET ORAL EVERY 4 HOURS
Refills: 0 | Status: DISCONTINUED | OUTPATIENT
Start: 2019-11-21 | End: 2019-11-22

## 2019-11-21 RX ORDER — ALBUMIN HUMAN 25 %
250 VIAL (ML) INTRAVENOUS ONCE
Refills: 0 | Status: COMPLETED | OUTPATIENT
Start: 2019-11-21 | End: 2019-11-21

## 2019-11-21 RX ADMIN — AZATHIOPRINE 50 MILLIGRAM(S): 100 TABLET ORAL at 14:02

## 2019-11-21 RX ADMIN — Medication 500 MILLILITER(S): at 01:00

## 2019-11-21 RX ADMIN — PYRIDOSTIGMINE BROMIDE 60 MILLIGRAM(S): 60 SOLUTION ORAL at 13:01

## 2019-11-21 RX ADMIN — SENNA PLUS 2 TABLET(S): 8.6 TABLET ORAL at 22:53

## 2019-11-21 RX ADMIN — Medication 975 MILLIGRAM(S): at 17:37

## 2019-11-21 RX ADMIN — PYRIDOSTIGMINE BROMIDE 60 MILLIGRAM(S): 60 SOLUTION ORAL at 05:43

## 2019-11-21 RX ADMIN — GABAPENTIN 200 MILLIGRAM(S): 400 CAPSULE ORAL at 17:36

## 2019-11-21 RX ADMIN — OXYCODONE HYDROCHLORIDE 10 MILLIGRAM(S): 5 TABLET ORAL at 14:00

## 2019-11-21 RX ADMIN — PYRIDOSTIGMINE BROMIDE 60 MILLIGRAM(S): 60 SOLUTION ORAL at 22:53

## 2019-11-21 RX ADMIN — PANTOPRAZOLE SODIUM 40 MILLIGRAM(S): 20 TABLET, DELAYED RELEASE ORAL at 05:43

## 2019-11-21 RX ADMIN — HEPARIN SODIUM 5000 UNIT(S): 5000 INJECTION INTRAVENOUS; SUBCUTANEOUS at 05:42

## 2019-11-21 RX ADMIN — HEPARIN SODIUM 5000 UNIT(S): 5000 INJECTION INTRAVENOUS; SUBCUTANEOUS at 17:37

## 2019-11-21 RX ADMIN — OXYCODONE HYDROCHLORIDE 10 MILLIGRAM(S): 5 TABLET ORAL at 15:15

## 2019-11-21 RX ADMIN — POLYETHYLENE GLYCOL 3350 17 GRAM(S): 17 POWDER, FOR SOLUTION ORAL at 13:01

## 2019-11-21 RX ADMIN — Medication 15 MILLIGRAM(S): at 13:01

## 2019-11-21 RX ADMIN — Medication 10 MILLIGRAM(S): at 06:58

## 2019-11-21 RX ADMIN — Medication 15 MILLIGRAM(S): at 13:15

## 2019-11-21 RX ADMIN — SODIUM CHLORIDE 30 MILLILITER(S): 9 INJECTION, SOLUTION INTRAVENOUS at 08:00

## 2019-11-21 NOTE — PROGRESS NOTE ADULT - SUBJECTIVE AND OBJECTIVE BOX
RASHAWN PAREDES                     [id number]    HPI:  Pt is a 54 yr old female Mandarin speaking scheduled for Bilateral Video Assisted Thoracoscopy Robotic Assisted Radical Thymectomy with Dr Parr 11/20/19. Pt seen in PST and translation used but pt is poor historian. Pt describes increased weakness over past 10yrs and now to have surgery to improve symptoms. Pt hx of multiple falls. (13 Nov 2019 17:14)      PAST MEDICAL & SURGICAL HISTORY:  Myasthenia gravis  No significant past surgical history      Procedure: robotic assisted radical thymectomy      Issues:  Myasthenia gravis  Thymoma  post op pain        VITAL SIGNS:  Vital Signs Last 24 Hrs  T(C): 36.4 (21 Nov 2019 04:00), Max: 37.1 (20 Nov 2019 20:00)  T(F): 97.6 (21 Nov 2019 04:00), Max: 98.7 (20 Nov 2019 20:00)  HR: 59 (21 Nov 2019 05:00) (59 - 67)  BP: 110/44 (21 Nov 2019 05:00) (91/47 - 122/58)  BP(mean): 61 (21 Nov 2019 05:00) (55 - 76)  RR: 10 (21 Nov 2019 05:00) (10 - 22)  SpO2: 100% (21 Nov 2019 05:00) (92% - 100%)    I/Os:   I&O's Detail    20 Nov 2019 07:01  -  21 Nov 2019 06:03  --------------------------------------------------------  IN:    IV PiggyBack: 500 mL    lactated ringers.: 450 mL  Total IN: 950 mL    OUT:    Chest Tube: 75 mL    Chest Tube: 150 mL    Voided: 1075 mL  Total OUT: 1300 mL    Total NET: -350 mL          CAPILLARY BLOOD GLUCOSE          =======================MEDICATIONS===================  MEDICATIONS  (STANDING):  albumin human  5% IVPB 250 milliLiter(s) IV Intermittent once  azaTHIOprine 50 milliGRAM(s) Oral daily  heparin  Injectable 5000 Unit(s) SubCutaneous every 12 hours  HYDROmorphone PCA (1 mG/mL) 30 milliLiter(s) PCA Continuous PCA Continuous  ketorolac   Injectable 15 milliGRAM(s) IV Push once  lactated ringers. 1000 milliLiter(s) (30 mL/Hr) IV Continuous <Continuous>  pantoprazole    Tablet 40 milliGRAM(s) Oral before breakfast  polyethylene glycol 3350 17 Gram(s) Oral daily  predniSONE   Tablet 10 milliGRAM(s) Oral daily  pyridostigmine 60 milliGRAM(s) Oral three times a day  senna 2 Tablet(s) Oral at bedtime    MEDICATIONS  (PRN):  HYDROmorphone PCA (1 mG/mL) Rescue Clinician Bolus 0.5 milliGRAM(s) IV Push every 15 minutes PRN for Pain Scale GREATER THAN 6          PHYSICAL EXAM============================  General:                         Awake, alert, not in any distress  Neuro:                            Moving all extremities to commands.   Respiratory:	Air entry fair and  bilateral conducted sounds                                           Effort even and unlabored.  CV:		Regular rate and rhythm. Normal S1/S2                                          Distal pulses present.  Abdomen:	                     Soft, non-distended. Bowel sounds present   Skin:		No rash.  Extremities:	Warm, no cyanosis or edema.  Palpable pulses    ============================LABS=========================                        12.6   10.68 )-----------( 207      ( 21 Nov 2019 02:45 )             37.0     11-21    141  |  104  |  10  ----------------------------<  117<H>  4.3   |  26  |  0.58    Ca    9.0      21 Nov 2019 02:45          ============================NEUROLOGY============================  Pain control with PCA /  Tylenol IV / Toradol  MG: Restart Mestinon ans steroids stress dose steroids in OR.   ==============================RESPIRATORY========================  Pt is on 2 L nasal canula   Comfortable, not in any distress.  Using incentive spirometry   MG: Nif and VC monitoring, Continuos pulse ox monitoring   Monitor chest tube output  Chest tube to suction 	  Continue bronchodilators, pulmonary toilet    ============================CARDIOVASCULAR======================  Continue hemodynamic monitoring.  Not on any pressors    =====================RENAL===================  Continue LR 30CC/hr    Monitor I/Os and electrolytes    ====================GASTROINTESTINAL===================  On regulars, tolerating  Continue GI prophylaxis with  Protonix  Continue Zofran / Reglan for nausea - PRN	    ========================HEMATOLOGIC/ONCOLOGIC====================  Monitor chest tube output. No signs of active bleeding.   Follow CBC in AM    ============================INFECTIOUS DISEASE========================  Monitor for fever / leukocytosis.  All surgical incision / chest tube  sites look clean      Pt is on GI & DVT prophylaxis  OOB & ambulate       Pertinent clinical, laboratory, radiographic, hemodynamic, echocardiographic, respiratory data, microbiologic data and chart were reviewed and analyzed frequently throughout the course of the day and night  Patient seen, examined and plan discussed with CT Surgery / CTICU team during rounds.    Pt's status discussed with family at bedside, updated status        Ramirez Andujar DO FACEP

## 2019-11-21 NOTE — PROGRESS NOTE ADULT - SUBJECTIVE AND OBJECTIVE BOX
Anesthesia Pain Management Service    SUBJECTIVE: Patient is doing well with IV PCA and no significant problems reported.    Pain Scale Score	At rest: ___ 	With Activity: ___ 	[X ] Refer to charted pain scores    THERAPY:    [ ] IV PCA Morphine		[ ] 5 mg/mL	[ ] 1 mg/mL  [X ] IV PCA Hydromorphone	[ ] 5 mg/mL	[X ] 1 mg/mL  [ ] IV PCA Fentanyl		[ ] 50 micrograms/mL    Demand dose __0.2_ lockout __6_ (minutes) Continuous Rate _0__ Total: 13.1  mg used (in past 24 hours)      MEDICATIONS  (STANDING):  albumin human  5% IVPB 250 milliLiter(s) IV Intermittent once  azaTHIOprine 50 milliGRAM(s) Oral daily  heparin  Injectable 5000 Unit(s) SubCutaneous every 12 hours  HYDROmorphone PCA (1 mG/mL) 30 milliLiter(s) PCA Continuous PCA Continuous  ketorolac   Injectable 15 milliGRAM(s) IV Push once  lactated ringers. 1000 milliLiter(s) (30 mL/Hr) IV Continuous <Continuous>  pantoprazole    Tablet 40 milliGRAM(s) Oral before breakfast  polyethylene glycol 3350 17 Gram(s) Oral daily  predniSONE   Tablet 10 milliGRAM(s) Oral daily  pyridostigmine 60 milliGRAM(s) Oral three times a day  senna 2 Tablet(s) Oral at bedtime    MEDICATIONS  (PRN):  HYDROmorphone PCA (1 mG/mL) Rescue Clinician Bolus 0.5 milliGRAM(s) IV Push every 15 minutes PRN for Pain Scale GREATER THAN 6      OBJECTIVE:    Sedation Score:	[ X] Alert	[ ] Drowsy 	[ ] Arousable	[ ] Asleep	[ ] Unresponsive    Side Effects:	[X ] None	[ ] Nausea	[ ] Vomiting	[ ] Pruritus  		[ ] Other:    Vital Signs Last 24 Hrs  T(C): 36.8 (21 Nov 2019 08:00), Max: 37.1 (20 Nov 2019 20:00)  T(F): 98.2 (21 Nov 2019 08:00), Max: 98.7 (20 Nov 2019 20:00)  HR: 60 (21 Nov 2019 11:00) (59 - 67)  BP: 95/49 (21 Nov 2019 11:00) (91/47 - 110/44)  BP(mean): 59 (21 Nov 2019 11:00) (55 - 76)  RR: 12 (21 Nov 2019 11:00) (10 - 22)  SpO2: 97% (21 Nov 2019 11:00) (89% - 100%)    ASSESSMENT/ PLAN    Therapy to  be:	[ ] Continue   [x] Discontinued   [x ] Change to prn Analgesics    Documentation and Verification of current medications:   [X] Done	[ ] Not done, not eligible    Comments:   - Recommend non-opioid adjuvant analgesics to be used when possible and when allowed by primary surgical team.  - Will d/c IV PCA today and recommend switching to Anesthesia Pain Management Service    SUBJECTIVE: Patient is doing well with IV PCA and no significant problems reported.    Pain Scale Score	At rest: _3__ 	With Activity: __3_ 	[X ] Refer to charted pain scores    THERAPY:    [ ] IV PCA Morphine		[ ] 5 mg/mL	[ ] 1 mg/mL  [X ] IV PCA Hydromorphone	[ ] 5 mg/mL	[X ] 1 mg/mL  [ ] IV PCA Fentanyl		[ ] 50 micrograms/mL    Demand dose __0.2_ lockout __6_ (minutes) Continuous Rate _0__ Total: 13.1  mg used (in past 24 hours)      MEDICATIONS  (STANDING):  albumin human  5% IVPB 250 milliLiter(s) IV Intermittent once  azaTHIOprine 50 milliGRAM(s) Oral daily  heparin  Injectable 5000 Unit(s) SubCutaneous every 12 hours  HYDROmorphone PCA (1 mG/mL) 30 milliLiter(s) PCA Continuous PCA Continuous  ketorolac   Injectable 15 milliGRAM(s) IV Push once  lactated ringers. 1000 milliLiter(s) (30 mL/Hr) IV Continuous <Continuous>  pantoprazole    Tablet 40 milliGRAM(s) Oral before breakfast  polyethylene glycol 3350 17 Gram(s) Oral daily  predniSONE   Tablet 10 milliGRAM(s) Oral daily  pyridostigmine 60 milliGRAM(s) Oral three times a day  senna 2 Tablet(s) Oral at bedtime    MEDICATIONS  (PRN):  HYDROmorphone PCA (1 mG/mL) Rescue Clinician Bolus 0.5 milliGRAM(s) IV Push every 15 minutes PRN for Pain Scale GREATER THAN 6      OBJECTIVE:    Sedation Score:	[ X] Alert	[ ] Drowsy 	[ ] Arousable	[ ] Asleep	[ ] Unresponsive    Side Effects:	[X ] None	[ ] Nausea	[ ] Vomiting	[ ] Pruritus  		[ ] Other:    Vital Signs Last 24 Hrs  T(C): 36.8 (21 Nov 2019 08:00), Max: 37.1 (20 Nov 2019 20:00)  T(F): 98.2 (21 Nov 2019 08:00), Max: 98.7 (20 Nov 2019 20:00)  HR: 60 (21 Nov 2019 11:00) (59 - 67)  BP: 95/49 (21 Nov 2019 11:00) (91/47 - 110/44)  BP(mean): 59 (21 Nov 2019 11:00) (55 - 76)  RR: 12 (21 Nov 2019 11:00) (10 - 22)  SpO2: 97% (21 Nov 2019 11:00) (89% - 100%)    ASSESSMENT/ PLAN    Therapy to  be:	[ ] Continue   [ ] Discontinued   [x ] Change to prn Analgesics    Documentation and Verification of current medications:   [X] Done	[ ] Not done, not eligible    Comments:   - Recommend non-opioid adjuvant analgesics to be used when possible and when allowed by primary surgical team.  - Will d/c IV PCA today and recommend switching to oxycodone, PO Tylenol, and gabapentin.   - Discussed with CTICU attending and RN.

## 2019-11-21 NOTE — PROGRESS NOTE ADULT - SUBJECTIVE AND OBJECTIVE BOX
POST ANESTHESIA EVALUATION    54y Female POSTOP DAY 1 S/P Bilateral Video Assisted Thoracoscopy Robotic Assisted Radical Thymectomy     MENTAL STATUS: Patient participation [ X ] Awake     [  ] Arousable     [  ] Sedated    AIRWAY PATENCY: [X  ] Satisfactory  [  ] Other:     Vital Signs Last 24 Hrs  T(C): 36.8 (21 Nov 2019 08:00), Max: 37.1 (20 Nov 2019 20:00)  T(F): 98.2 (21 Nov 2019 08:00), Max: 98.7 (20 Nov 2019 20:00)  HR: 66 (21 Nov 2019 10:00) (59 - 67)  BP: 96/47 (21 Nov 2019 10:00) (91/47 - 110/44)  BP(mean): 59 (21 Nov 2019 10:00) (55 - 76)  RR: 12 (21 Nov 2019 10:00) (10 - 22)  SpO2: 89% (21 Nov 2019 10:00) (89% - 100%)  I&O's Summary    20 Nov 2019 07:01  -  21 Nov 2019 07:00  --------------------------------------------------------  IN: 980 mL / OUT: 1300 mL / NET: -320 mL    21 Nov 2019 07:01  -  21 Nov 2019 10:59  --------------------------------------------------------  IN: 120 mL / OUT: 10 mL / NET: 110 mL          NAUSEA/ VOMITTING:  [ X ] NONE  [  ] CONTROLLED [  ] OTHER     PAIN: [ X ] CONTROLLED WITH CURRENT REGIMEN  [  ] OTHER    [ X ] NO APPARENT ANESTHESIA COMPLICATIONS      Comments: Video  #[id number] Thaddeus More (Mandarin) used for encounter.

## 2019-11-21 NOTE — CONSULT NOTE ADULT - ASSESSMENT
54Y F w/ PMH MG who was admitted for thymectomy to improve myasthenia symptoms. She is post op day 1 without acute complaints except pain. NIFs/VC are stable, not currently in myasthenic crisis. She received stress dose steroids in the OR and is currently on her home Mestinon. Neuro exam non-focal, no weakness, diplopia or ptosis noted.     Recommend  - continue Imuron 50mg QD  - continue home dose mestinon of 60mg TID  - monitor NIF/VC q4h  - if concern for worsening respiratory status, do not go by O2 sat or ABGs for need for intubation as patients will become hypercarbic before becoming hypoxic  - Avoid the following medications: Aminoglycosides, erythromycin, azithromycin, tetracyclines, ciprofloxacin, clindamycin, phenytoin, lithium, beta blockers, procainamide, quinidine, magnesium    Upon discharge follow up with private outpatient Neurologist. If patient wishes, she can be seen in our Neurology Clinic by Dr. Finley at [phone]    Discussed with Neuro attending, Dr. Silva

## 2019-11-21 NOTE — CONSULT NOTE ADULT - SUBJECTIVE AND OBJECTIVE BOX
HPI:  Patient is a 54Y Mandarin speaking female w/ PMH MG who was admitted for thymectomy. She has had MG for the past 10 years with increasing weakness in the last couple of years. She was referred by her Neurologist to have thymectomy to improve her symptoms. She is now s/p thymectomy 11/20 with NIFs -30/-40 and VC ~1.5L. Neurology consulted for in house Imuron approval.           MEDICATIONS  (STANDING):  albumin human  5% IVPB 250 milliLiter(s) IV Intermittent once  azaTHIOprine 50 milliGRAM(s) Oral daily  heparin  Injectable 5000 Unit(s) SubCutaneous every 12 hours  HYDROmorphone PCA (1 mG/mL) 30 milliLiter(s) PCA Continuous PCA Continuous  ketorolac   Injectable 15 milliGRAM(s) IV Push once  lactated ringers. 1000 milliLiter(s) (30 mL/Hr) IV Continuous <Continuous>  pantoprazole    Tablet 40 milliGRAM(s) Oral before breakfast  polyethylene glycol 3350 17 Gram(s) Oral daily  predniSONE   Tablet 10 milliGRAM(s) Oral daily  pyridostigmine 60 milliGRAM(s) Oral three times a day  senna 2 Tablet(s) Oral at bedtime    MEDICATIONS  (PRN):  HYDROmorphone PCA (1 mG/mL) Rescue Clinician Bolus 0.5 milliGRAM(s) IV Push every 15 minutes PRN for Pain Scale GREATER THAN 6    PAST MEDICAL & SURGICAL HISTORY:  Myasthenia gravis  No significant past surgical history    FAMILY HISTORY:  No pertinent family history in first degree relatives    Allergies    No Known Allergies    Intolerances        SHx - No smoking, No ETOH, No drug abuse    Review of Systems:  A 10 system ROS was performed and is negative except for those mentioned in HPI      Vital Signs Last 24 Hrs  T(C): 36.8 (21 Nov 2019 08:00), Max: 37.1 (20 Nov 2019 20:00)  T(F): 98.2 (21 Nov 2019 08:00), Max: 98.7 (20 Nov 2019 20:00)  HR: 60 (21 Nov 2019 11:00) (59 - 67)  BP: 95/49 (21 Nov 2019 11:00) (91/47 - 110/44)  BP(mean): 59 (21 Nov 2019 11:00) (55 - 76)  RR: 12 (21 Nov 2019 11:00) (10 - 22)  SpO2: 97% (21 Nov 2019 11:00) (89% - 100%)    Neurological (>12):  MS: Awake, alert, oriented to person, place, situation, time. Normal affect. Follows all commands.    Language: Speech is clear, fluent with good repetition & comprehension    CNs: PERRLA (R = 3mm, L = 3mm). VFF. EOMI no nystagmus, no diplopia, no ptosis. V1-3 intact to LT/pinprick, well developed masseter muscles b/l. No facial asymmetry b/l, full eye closure strength b/l. Hearing grossly normal (rubbing fingers) b/l. Symmetric palate elevation in midline. Gag reflex deferred. Head turning & shoulder shrug intact b/l. Tongue midline, normal movements, no atrophy.      Motor: Normal muscle bulk & tone. No noticeable tremor or seizure. No pronator drift.              Deltoid	Biceps	Triceps	Wrist	Finger ABd	   R	5	5	5	5	5		5 	  L	5	5	5	5	5		5    	H-Flex	H-Ext	H-ABd	H-ADd	K-Flex	K-Ext	D-Flex	P-Flex  R	5	5	5	5	5	5	5	5 	   L	5	5	5	5	5	5	5	5	     Sensation: Intact to LT/PP/Temp/Vibration/Position b/l throughout.     Cortical: Extinction on DSS (neglect): none    Reflexes:              Biceps(C5)       BR(C6)     Triceps(C7)               Patellar(L4)    Achilles(S1)    Plantar Resp  R	2	          2	             2		        2		    2		Down   L	2	          2	             2		        2		    2		Down     Coordination: intact rapid-alt movements. No dysmetria to FTN/HTS    Gait: deferred        11-21    141  |  104  |  10  ----------------------------<  117<H>  4.3   |  26  |  0.58    Ca    9.0      21 Nov 2019 02:45      11-21    141  |  104  |  10  ----------------------------<  117<H>  4.3   |  26  |  0.58    Ca    9.0      21 Nov 2019 02:45                            12.6   10.68 )-----------( 207      ( 21 Nov 2019 02:45 )             37.0       Radiology

## 2019-11-22 ENCOUNTER — TRANSCRIPTION ENCOUNTER (OUTPATIENT)
Age: 54
End: 2019-11-22

## 2019-11-22 VITALS
TEMPERATURE: 98 F | HEART RATE: 62 BPM | DIASTOLIC BLOOD PRESSURE: 61 MMHG | OXYGEN SATURATION: 100 % | RESPIRATION RATE: 18 BRPM | SYSTOLIC BLOOD PRESSURE: 114 MMHG

## 2019-11-22 LAB
ANION GAP SERPL CALC-SCNC: 11 MMO/L — SIGNIFICANT CHANGE UP (ref 7–14)
BUN SERPL-MCNC: 16 MG/DL — SIGNIFICANT CHANGE UP (ref 7–23)
CALCIUM SERPL-MCNC: 9.2 MG/DL — SIGNIFICANT CHANGE UP (ref 8.4–10.5)
CHLORIDE SERPL-SCNC: 106 MMOL/L — SIGNIFICANT CHANGE UP (ref 98–107)
CO2 SERPL-SCNC: 25 MMOL/L — SIGNIFICANT CHANGE UP (ref 22–31)
CREAT SERPL-MCNC: 0.92 MG/DL — SIGNIFICANT CHANGE UP (ref 0.5–1.3)
GLUCOSE SERPL-MCNC: 102 MG/DL — HIGH (ref 70–99)
HCT VFR BLD CALC: 38.5 % — SIGNIFICANT CHANGE UP (ref 34.5–45)
HGB BLD-MCNC: 12.7 G/DL — SIGNIFICANT CHANGE UP (ref 11.5–15.5)
MAGNESIUM SERPL-MCNC: 2.2 MG/DL — SIGNIFICANT CHANGE UP (ref 1.6–2.6)
MCHC RBC-ENTMCNC: 31.9 PG — SIGNIFICANT CHANGE UP (ref 27–34)
MCHC RBC-ENTMCNC: 33 % — SIGNIFICANT CHANGE UP (ref 32–36)
MCV RBC AUTO: 96.7 FL — SIGNIFICANT CHANGE UP (ref 80–100)
NRBC # FLD: 0 K/UL — SIGNIFICANT CHANGE UP (ref 0–0)
PLATELET # BLD AUTO: 212 K/UL — SIGNIFICANT CHANGE UP (ref 150–400)
PMV BLD: 10.4 FL — SIGNIFICANT CHANGE UP (ref 7–13)
POTASSIUM SERPL-MCNC: 4 MMOL/L — SIGNIFICANT CHANGE UP (ref 3.5–5.3)
POTASSIUM SERPL-SCNC: 4 MMOL/L — SIGNIFICANT CHANGE UP (ref 3.5–5.3)
RBC # BLD: 3.98 M/UL — SIGNIFICANT CHANGE UP (ref 3.8–5.2)
RBC # FLD: 13.1 % — SIGNIFICANT CHANGE UP (ref 10.3–14.5)
SODIUM SERPL-SCNC: 142 MMOL/L — SIGNIFICANT CHANGE UP (ref 135–145)
WBC # BLD: 7.66 K/UL — SIGNIFICANT CHANGE UP (ref 3.8–10.5)
WBC # FLD AUTO: 7.66 K/UL — SIGNIFICANT CHANGE UP (ref 3.8–10.5)

## 2019-11-22 PROCEDURE — 71045 X-RAY EXAM CHEST 1 VIEW: CPT | Mod: 26

## 2019-11-22 PROCEDURE — 99232 SBSQ HOSP IP/OBS MODERATE 35: CPT

## 2019-11-22 PROCEDURE — 99233 SBSQ HOSP IP/OBS HIGH 50: CPT

## 2019-11-22 PROCEDURE — 71045 X-RAY EXAM CHEST 1 VIEW: CPT | Mod: 26,77

## 2019-11-22 RX ORDER — DOCUSATE SODIUM 100 MG
1 CAPSULE ORAL
Qty: 30 | Refills: 0
Start: 2019-11-22 | End: 2019-12-01

## 2019-11-22 RX ORDER — OXYCODONE HYDROCHLORIDE 5 MG/1
1 TABLET ORAL
Qty: 40 | Refills: 0
Start: 2019-11-22 | End: 2019-12-01

## 2019-11-22 RX ORDER — ACETAMINOPHEN 500 MG
2 TABLET ORAL
Qty: 50 | Refills: 0
Start: 2019-11-22 | End: 2019-12-01

## 2019-11-22 RX ADMIN — PYRIDOSTIGMINE BROMIDE 60 MILLIGRAM(S): 60 SOLUTION ORAL at 14:09

## 2019-11-22 RX ADMIN — GABAPENTIN 200 MILLIGRAM(S): 400 CAPSULE ORAL at 06:30

## 2019-11-22 RX ADMIN — PANTOPRAZOLE SODIUM 40 MILLIGRAM(S): 20 TABLET, DELAYED RELEASE ORAL at 06:29

## 2019-11-22 RX ADMIN — PYRIDOSTIGMINE BROMIDE 60 MILLIGRAM(S): 60 SOLUTION ORAL at 06:29

## 2019-11-22 RX ADMIN — GABAPENTIN 200 MILLIGRAM(S): 400 CAPSULE ORAL at 14:08

## 2019-11-22 RX ADMIN — Medication 10 MILLIGRAM(S): at 06:29

## 2019-11-22 RX ADMIN — AZATHIOPRINE 50 MILLIGRAM(S): 100 TABLET ORAL at 14:09

## 2019-11-22 RX ADMIN — Medication 975 MILLIGRAM(S): at 06:29

## 2019-11-22 RX ADMIN — POLYETHYLENE GLYCOL 3350 17 GRAM(S): 17 POWDER, FOR SOLUTION ORAL at 14:09

## 2019-11-22 RX ADMIN — HEPARIN SODIUM 5000 UNIT(S): 5000 INJECTION INTRAVENOUS; SUBCUTANEOUS at 06:30

## 2019-11-22 NOTE — DISCHARGE NOTE PROVIDER - NSDCFUADDAPPT_GEN_ALL_CORE_FT
Call within 1-2 day to schedule follow-up appointment with Dr. Parr. Obtain Chest X-Ray 1 day before seeing Dr. Parr.

## 2019-11-22 NOTE — PROGRESS NOTE ADULT - SUBJECTIVE AND OBJECTIVE BOX
RASHAWN PAREDES  MRN-[id number]    Patient is a 54y old  Female who presents with a chief complaint of thymectomy (21 Nov 2019 12:34)    HPI:  Pt is a 54 yr old Mandarin speaking female with a history of myasthenia gravis that was found to have a thymoma and is s/p thymectomy. Per history the patient described progressively worsening weakness over the past ten years with a history of multiple falls. She is now s/p bilateral video assisted thoracoscopy and robotic assisted radical thymectomy on 11/20/19. She has no complaints.     PAST MEDICAL & SURGICAL HISTORY:  Myasthenia gravis  No significant past surgical history    FAMILY HISTORY:  No pertinent family history in first degree relatives    Social History:  Denies smoking, illicit drug use or frequent alcohol consumption    Allergies  No Known Allergies    MEDICATIONS  (STANDING):  acetaminophen   Tablet .. 975 milliGRAM(s) Oral every 6 hours  albumin human  5% IVPB 250 milliLiter(s) IV Intermittent once  azaTHIOprine 50 milliGRAM(s) Oral daily  gabapentin 200 milliGRAM(s) Oral every 8 hours  heparin  Injectable 5000 Unit(s) SubCutaneous every 12 hours  pantoprazole    Tablet 40 milliGRAM(s) Oral before breakfast  polyethylene glycol 3350 17 Gram(s) Oral daily  predniSONE   Tablet 10 milliGRAM(s) Oral daily  pyridostigmine 60 milliGRAM(s) Oral three times a day  senna 2 Tablet(s) Oral at bedtime    MEDICATIONS  (PRN):  oxyCODONE    IR 5 milliGRAM(s) Oral every 4 hours PRN Moderate Pain (4 - 6)  oxyCODONE    IR 10 milliGRAM(s) Oral every 4 hours PRN Severe Pain (7 - 10)    Review of Systems:  Constitutional:  Negative for weight change, fever, malaise  HEENT:  Negative for sinus pain, hoarseness, sore throat, dysphagia, vision changes  Cardiovascular:  Negative for chest pain, palpitations, dizziness  Respiratory:  Negative for cough, wheezing, dyspnea  Gastrointestinal:  Negative for nausea, vomiting, diarrhea, melena  Musculoskeletal:  Negative for pain, swelling, stiffness   Neuro:  Negative for weakness, numbness, headache  Psych:  Negative for anxiety, depression  Endocrine:  Negative for polyuria, polydipsia, temperature Intolerance    All other systems negative unless otherwise stated    ICU Vital Signs Last 24 Hrs  T(C): 36.8 (22 Nov 2019 04:00), Max: 37.2 (22 Nov 2019 00:00)  T(F): 98.2 (22 Nov 2019 04:00), Max: 99 (22 Nov 2019 00:00)  HR: 60 (22 Nov 2019 06:00) (59 - 74)  BP: 102/57 (22 Nov 2019 06:00) (91/51 - 119/56)  BP(mean): 69 (22 Nov 2019 06:00) (59 - 69)  ABP: --  ABP(mean): --  RR: 15 (22 Nov 2019 06:00) (10 - 18)  SpO2: 98% (22 Nov 2019 06:00) (89% - 100%)    Daily     Daily   I&O's Summary    20 Nov 2019 07:01  -  21 Nov 2019 07:00  --------------------------------------------------------  IN: 980 mL / OUT: 1300 mL / NET: -320 mL    21 Nov 2019 07:01  -  22 Nov 2019 06:58  --------------------------------------------------------  IN: 780 mL / OUT: 2465 mL / NET: -1685 mL    Physical Exam:  Gen: Alert, no apparent distress  CV: Regular rate and rhythm no murmurs, rubs or gallops  Pulm: Clear to auscultation bilaterally, no rales, rhonchi or wheezes  Chest: Chest tubes/drains in place with dressings clean, dry and intact  GI: Abd is soft, non-tender and non-distended with +BS  Ext: No clubbing, cyanosis or edema  Neuro: A+Ox3, follows commands and moves all extremities    Labs:                          12.7   7.66  )-----------( 212      ( 22 Nov 2019 03:00 )             38.5       11-22    142  |  106  |  16  ----------------------------<  102<H>  4.0   |  25  |  0.92    Ca    9.2      22 Nov 2019 03:00  Mg     2.2     11-22    Assessment/Plan: Pt is a 54 yr old Mandarin speaking female with a history of myasthenia gravis that was found to have a thymoma and is s/p thymectomy.    Neuro:   Pain control with Oxycodone / Tylenol IV   Continue outpatient Imuran, Mestinon and Prednisone for myasthenia gravis  Vital capacity and NIF measurements q8h - most recent values acceptable                                          Cardiovascular:    Stable hemodynamics  Not on any pressors  Continue hemodynamic monitoring    Respiratory:  Pt is comfortable on nasal cannula  Encourage incentive spirometry  Monitor chest tube output  Chest tube to suction with improved pneumothorax	    GI:  Tolerating regular diet  Continue bowel regimen, Protonix  Continue Zofran for nausea - PRN	          Renal:  Monitor I/Os and electrolytes    Hematologic / Oncology:  No signs of active bleeding, H/H stable  Monitor chest tube output    HSQ for DVT ppl    Infectious disease:  All surgical incision / chest tube sites look clean  No fever or other signs of infection     Endocrine:  Continue Accuchecks with coverage    All clinical, lab, hemodynamic and radiographic data were reviewed and the plan was discussed with CTICU team.     Jose G Snell MD

## 2019-11-22 NOTE — DISCHARGE NOTE PROVIDER - HOSPITAL COURSE
54 year old Mandarin speaking female with a history of myasthenia gravis that was found to have a thymoma and is now s/p bilateral video assisted thoracoscopy and robotic assisted radical thymectomy on 11/20/19. Patient had Left sided chest tube removed on 11/21/19, post CXR showed b/l pneumothorax. Right chest tube was placed to suction with repeat CXR showing decrease in Pneumothorax. 11/22/19 Right chest tube placed to waterseal with CXR showing no Pneumothorax. Right chest tube removed with post CXR showing no pneumothorax. Patient on Room Air, stable for discharge home.

## 2019-11-22 NOTE — DISCHARGE NOTE NURSING/CASE MANAGEMENT/SOCIAL WORK - PATIENT PORTAL LINK FT
You can access the FollowMyHealth Patient Portal offered by [facility] by registering at the following website: [email or web address]. By joining [facility]’s FollowMyHealth portal, you will also be able to view your health information using other applications (apps) compatible with our system.

## 2019-11-22 NOTE — DISCHARGE NOTE NURSING/CASE MANAGEMENT/SOCIAL WORK - NSDCPNINST_GEN_ALL_CORE
Call 911 for chest pain, and/or difficulty breathing. Report to your doctor any fever, swelling/increased redness/pus to your incisions, pain not relieved by pain medications.

## 2019-11-22 NOTE — DISCHARGE NOTE PROVIDER - CARE PROVIDER_API CALL
Justin Parr (MD)  Surgery; Thoracic Surgery  [id number] [place] [address]  Phone: [phone]  Fax: [phone]  Follow Up Time:

## 2019-11-22 NOTE — DISCHARGE NOTE PROVIDER - NSDCMRMEDTOKEN_GEN_ALL_CORE_FT
acetaminophen 650 mg oral tablet, extended release: 2 tab(s) orally every 8 hours, As Needed -for mild pain   azaTHIOprine 50 mg oral tablet: 1 tab(s) orally once a day  docusate sodium 100 mg oral capsule: 1 cap(s) orally 3 times a day, As Needed -for constipation   Oxaydo 5 mg oral tablet: 1 tab(s) orally every 6 hours, As Needed -Moderate Pain (4 - 6) - for moderate pain MDD:4   predniSONE 10 mg oral tablet: 1 tab(s) orally once a day  pyridostigmine 60 mg oral tablet: 1 tab(s) orally 4 times a day

## 2019-12-05 ENCOUNTER — APPOINTMENT (OUTPATIENT)
Dept: THORACIC SURGERY | Facility: CLINIC | Age: 54
End: 2019-12-05
Payer: MEDICAID

## 2019-12-05 VITALS
TEMPERATURE: 98.7 F | DIASTOLIC BLOOD PRESSURE: 71 MMHG | SYSTOLIC BLOOD PRESSURE: 112 MMHG | HEART RATE: 79 BPM | BODY MASS INDEX: 22.13 KG/M2 | RESPIRATION RATE: 18 BRPM | OXYGEN SATURATION: 96 % | WEIGHT: 121 LBS

## 2019-12-05 PROCEDURE — 99024 POSTOP FOLLOW-UP VISIT: CPT

## 2020-02-03 NOTE — H&P ADULT - NEUROLOGICAL DETAILS
strength decreased/sensation intact/responds to pain/responds to verbal commands/alert and oriented x 3 Xolair Pregnancy And Lactation Text: This medication is Pregnancy Category B and is considered safe during pregnancy. This medication is excreted in breast milk.

## 2020-02-13 ENCOUNTER — APPOINTMENT (OUTPATIENT)
Dept: THORACIC SURGERY | Facility: CLINIC | Age: 55
End: 2020-02-13
Payer: MEDICAID

## 2020-02-13 VITALS
BODY MASS INDEX: 21.95 KG/M2 | SYSTOLIC BLOOD PRESSURE: 117 MMHG | HEART RATE: 62 BPM | TEMPERATURE: 98.2 F | OXYGEN SATURATION: 97 % | WEIGHT: 120 LBS | RESPIRATION RATE: 17 BRPM | DIASTOLIC BLOOD PRESSURE: 75 MMHG

## 2020-02-13 PROCEDURE — 99024 POSTOP FOLLOW-UP VISIT: CPT

## 2021-02-11 ENCOUNTER — APPOINTMENT (OUTPATIENT)
Dept: THORACIC SURGERY | Facility: CLINIC | Age: 56
End: 2021-02-11
Payer: MEDICAID

## 2021-02-11 DIAGNOSIS — G70.00 MYASTHENIA GRAVIS W/OUT (ACUTE) EXACERBATION: ICD-10-CM

## 2021-02-11 PROCEDURE — 99442: CPT

## 2021-02-11 NOTE — CONSULT LETTER
[Dear  ___] : Dear  [unfilled], [Consult Letter:] : I had the pleasure of evaluating your patient, [unfilled]. [( Thank you for referring [unfilled] for consultation for _____ )] : Thank you for referring [unfilled] for consultation for [unfilled] [Please see my note below.] : Please see my note below. [Consult Closing:] : Thank you very much for allowing me to participate in the care of this patient.  If you have any questions, please do not hesitate to contact me. [Sincerely,] : Sincerely, [FreeTextEntry2] : Darian Rosales MD (PCP/Referring)\par Dr. Calvert (Neurologist)  [FreeTextEntry3] : Justin Parr MD, MPH \par System Director of Thoracic Surgery \par Director of [facility] [place] \par Professor Cardiovascular & Thoracic Surgery \par [facility] [employer] [facility]\par [facility]\par [address]\par Oncology [address]\[place] [address]\par Tel: [phone]\par Fax: [phone]\par

## 2021-02-11 NOTE — ASSESSMENT
[FreeTextEntry1] : Ms. RASHAWN PAREDES, 56 year old female, never smoker, w/ hx of Myasthenia Gravis on Mestinol on/off x 10 years, hyperglycemia 2/2 steroid use (not on meds), who presented had a flare-up 2/2019 2/2 UTI, s/p IVIG and prednisone. \par \par Now 1yr 3mo s/p Bilateral VATS, Robotic-assisted, Radical Thymectomy on 11/20/19. Path revealed Thymic hyperplasia.\par \par CT Chest w/o contrast on 2/9/2021 reveals no evidence of disease recurrence.\par \par I have reviewed the patient's medical records and diagnostic images at time of this office consultation and have made the following recommendation:\par 1. CT Chest reviewed with patient. There is no evidence of disease recurrence. I recommend the patient RTC PRN.\par  \par \par I have spent 20 minutes on the phone with patient explaining all of the above\par \par I personally performed the services described in the documentation, reviewed the documentation recorded by the scribe in my presence and it accurately and completely records my words and actions.\par \par I, Candy Ibarra NP, am scribing for and the presence of KUNAL Black, the following sections HISTORY OF PRESENT ILLNESS, PAST MEDICAL/FAMILY/SOCIAL HISTORY; REVIEW OF SYSTEMS; VITAL SIGNS; PHYSICAL EXAM; DISPOSITION.\par \par

## 2021-02-11 NOTE — HISTORY OF PRESENT ILLNESS
[Home] : at home, [unfilled] , at the time of the visit. [Medical Office: ([facility])___] : at the medical office located in  [Verbal consent obtained from patient] : the patient, [unfilled] [FreeTextEntry1] : \par Ms. RASHAWN PAREDES, 56 year old female, never smoker, w/ hx of Myasthenia Gravis on Mestinol on/off x 10 years, hyperglycemia 2/2 steroid use (not on meds), who presented had a flare-up 2/2019 2/2 UTI, s/p IVIG and prednisone. \par \par CT Chest on 11/4/19 showed no mediastinal mass.\par \par PFTs on 11/15/19: FVC 91%, FEV1 84%, DLCO 103%.\par \par Now 1yr 3mo s/p Bilateral VATS, Robotic-assisted, Radical Thymectomy on 11/20/19. Path revealed Thymic hyperplasia.\par \par CT Chest w/o contrast on 2/9/2021 reveals no evidence of disease recurrence.\par \par Patient denies SOB, cough, fever, chills, changes in medications, or hospitalizations in the past year. \par \par

## 2021-02-11 NOTE — DATA REVIEWED
[FreeTextEntry1] : \par CT Chest w/o contrast on 2/9/2021 reveals no evidence of disease recurrence.

## 2022-05-23 NOTE — ED PROVIDER NOTE - CONDUCTED A DETAILED DISCUSSION WITH PATIENT AND/OR GUARDIAN REGARDING, MDM
Have you gotten a chance to review labs yet?  [see HPI] : see HPI [Negative] : Heme/Lymph lab results/need to admit/radiology results